# Patient Record
Sex: FEMALE | Race: WHITE | NOT HISPANIC OR LATINO | Employment: FULL TIME
[De-identification: names, ages, dates, MRNs, and addresses within clinical notes are randomized per-mention and may not be internally consistent; named-entity substitution may affect disease eponyms.]

---

## 2017-08-05 ENCOUNTER — HEALTH MAINTENANCE LETTER (OUTPATIENT)
Age: 47
End: 2017-08-05

## 2021-09-22 ENCOUNTER — HOSPITAL ENCOUNTER (EMERGENCY)
Facility: CLINIC | Age: 51
Discharge: HOME | End: 2021-09-22
Attending: NURSE PRACTITIONER | Admitting: NURSE PRACTITIONER
Payer: COMMERCIAL

## 2021-09-22 VITALS
SYSTOLIC BLOOD PRESSURE: 176 MMHG | HEART RATE: 92 BPM | HEIGHT: 61 IN | WEIGHT: 140 LBS | OXYGEN SATURATION: 100 % | BODY MASS INDEX: 26.43 KG/M2 | DIASTOLIC BLOOD PRESSURE: 94 MMHG | TEMPERATURE: 97.9 F | RESPIRATION RATE: 18 BRPM

## 2021-09-22 DIAGNOSIS — S06.0XAA CONCUSSION: ICD-10-CM

## 2021-09-22 DIAGNOSIS — S09.90XA MINOR HEAD INJURY, INITIAL ENCOUNTER: ICD-10-CM

## 2021-09-22 PROBLEM — F41.0 PANIC ATTACK: Status: ACTIVE | Noted: 2020-11-19

## 2021-09-22 PROBLEM — Z17.0 MALIGNANT NEOPLASM OF UPPER-OUTER QUADRANT OF LEFT BREAST IN FEMALE, ESTROGEN RECEPTOR POSITIVE (H): Status: ACTIVE | Noted: 2019-05-23

## 2021-09-22 PROBLEM — I10 HYPERTENSION: Status: ACTIVE | Noted: 2021-09-22

## 2021-09-22 PROBLEM — F41.9 ANXIETY: Status: ACTIVE | Noted: 2020-12-22

## 2021-09-22 PROBLEM — C50.412 MALIGNANT NEOPLASM OF UPPER-OUTER QUADRANT OF LEFT BREAST IN FEMALE, ESTROGEN RECEPTOR POSITIVE (H): Status: ACTIVE | Noted: 2019-05-23

## 2021-09-22 PROCEDURE — 99282 EMERGENCY DEPT VISIT SF MDM: CPT

## 2021-09-22 ASSESSMENT — ENCOUNTER SYMPTOMS
WEAKNESS: 0
HEADACHES: 1
VOMITING: 0
NUMBNESS: 0
WOUND: 1
NAUSEA: 0
APPETITE CHANGE: 0

## 2021-09-22 ASSESSMENT — MIFFLIN-ST. JEOR: SCORE: 1182.42

## 2021-09-22 NOTE — DISCHARGE INSTRUCTIONS
You may use the [facility] website Heads Up there is also an hernan.  This has sports based return to normal activity stepwise approach.  However this can be applied to any situation involving concussion.

## 2021-09-22 NOTE — ED TRIAGE NOTES
Pt reports tripped over sisters slippers in bathroom. Pt hit head on sink/radiator. Denies LOC. Bleeding controlled.

## 2021-09-22 NOTE — ED PROVIDER NOTES
History   Chief Complaint:  Head Injury       The history is provided by the patient.      Negar Jacobs is a 51 year old female with history of anxiety, depression, and hypertension who presents with head injury. Four days ago, the patient tripped over a pair of shoes in the bathroom and hit her forehead on a piece of marble above the radiator. She did not lose consciousness but said it felt like she had the wind knocked out of her. Since then, she has been experiencing slight head pressure, minor forgetfulness, and describes not feeling quite like herself. She denies nausea, vomiting, visual changes, weakness, numbness, and changes in appetite.     Review of Systems   Constitutional: Negative for appetite change.   Eyes: Negative for visual disturbance.   Gastrointestinal: Negative for nausea and vomiting.   Skin: Positive for wound (minor forehead abrasion).   Neurological: Positive for headaches (head pressure). Negative for weakness and numbness.   Psychiatric/Behavioral:        Minor forgetfulness   All other systems reviewed and are negative.      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 She previously worked in  ".     Physical Exam     Patient Vitals for the past 24 hrs:   BP Temp Temp src Pulse Resp SpO2 Height Weight   09/22/21 1629 (!) 176/94 97.9  F (36.6  C) Temporal 92 18 100 % 1.549 m (5' 1\") 63.5 kg (140 lb)       Physical Exam  General:  Alert, No obvious discomfort, well kept  HEENT:  Normal Voice, PERRL, EOM normal, oropharynx is normal, Uvula is midline, Conjunctivae and sclerae are normal, No lymphadenopathy, mild dependant ecchymosis to upper left eyelid, no hematoma, no crepitus   Neck: Normal range of motion, No meningismus. There is no midline cervical spine pain/tenderness. No mass is detected  CV:  Regular rate and underlying rhythm, Normal Peripheral pulses. No murmurs, rubs or clicks  Resp: Lungs are clear, No tachypnea, Non-labored breathing, No wheezing, crackles, or rales   GI:  Abdomen is soft, there is no rigidity, No distension, No tympani, No rebound tenderness, Non-surgical without peritoneal features    MS:  No major joint effusions, No asymmetric leg swelling. No calf tenderness  Skin:  Very minor superficial abrasion to left forehead, Warm, Dry  Neuro: Alert and oriented to person/place and time.  Cranial nerves II through XII grossly intact, Normal strength and sensation, follows commands, responds appropriately. GCS 15  Psych: Awake. Alert.  Mildly anxious.  Appropriate interactions. Good eye contact    Emergency Department Course   Emergency Department Course:    Reviewed:  I reviewed nursing notes, vitals, past medical history and care everywhere    Assessments:  1705 I obtained history and examined the patient as noted above.     Disposition:  The patient was discharged to home.       Impression & Plan     Medical Decision Making:  Negar Jacobs is a 51 year old female who presents today for evaluation of feelings of fogginess and not quite feeling herself after fall and head injury 4 days ago.  She did not have any loss of consciousness.  Was initially seen by urgent care and " sent to the emergency department for further evaluation.  Her examination here shows no focal neurologic deficits.  She describes textbook concussion symptoms.  She has had no increasing neurologic symptoms.  She initially was [place] CT rule negative after her fall and remains so today.  She does not have any increasing neurologic symptoms that would be concerning for delayed bleed.  After discussion there was no indication for CT imagery.  We discussed concussion symptoms and post concussion protocol.  I have referred her to the concussion .  There was no indication for further testing or imaging.  She appears to be safe and appropriate for outpatient management follow-up and is discharged home.    Covid-19  Negar Jacobs was evaluated during a global COVID-19 pandemic, which necessitated consideration that the patient might be at risk for infection with the SARS-CoV-2 virus that causes COVID-19.   Applicable protocols for evaluation were followed during the patient's care.     Diagnosis:    ICD-10-CM    1. Minor head injury, initial encounter  S09.90XA Concussion  Referral   2. Concussion  S06.0X9A Concussion  Referral       Scribe Disclosure:  I, Mariama Martin, am serving as a scribe at 4:44 PM on 9/22/2021 to document services personally performed by Wes Olivo APRN based on my observations and the provider's statements to me.     [facility]         Wes Olivo APRN CNP  09/22/21 4947

## 2021-10-01 ENCOUNTER — VIRTUAL VISIT (OUTPATIENT)
Dept: NEUROLOGY | Facility: CLINIC | Age: 51
End: 2021-10-01
Payer: COMMERCIAL

## 2021-10-01 VITALS — HEIGHT: 62 IN | WEIGHT: 135 LBS | BODY MASS INDEX: 24.84 KG/M2

## 2021-10-01 DIAGNOSIS — S09.90XA MINOR HEAD INJURY, INITIAL ENCOUNTER: ICD-10-CM

## 2021-10-01 DIAGNOSIS — F07.81 POST CONCUSSION SYNDROME: Primary | ICD-10-CM

## 2021-10-01 PROCEDURE — 99205 OFFICE O/P NEW HI 60 MIN: CPT | Mod: GT | Performed by: NURSE PRACTITIONER

## 2021-10-01 RX ORDER — HYDROXYZINE PAMOATE 25 MG/1
25 CAPSULE ORAL PRN
COMMUNITY
Start: 2021-07-01

## 2021-10-01 RX ORDER — ESCITALOPRAM OXALATE 20 MG/1
20 TABLET ORAL DAILY
COMMUNITY
Start: 2021-08-19

## 2021-10-01 RX ORDER — HYDROCHLOROTHIAZIDE 12.5 MG/1
12.5 CAPSULE ORAL DAILY
COMMUNITY
Start: 2021-03-05

## 2021-10-01 RX ORDER — TAMOXIFEN CITRATE 20 MG/1
1 TABLET ORAL DAILY
COMMUNITY
Start: 2021-09-25

## 2021-10-01 RX ORDER — OMEGA-3/DHA/EPA/FISH OIL 1600MG/5ML
15 LIQUID (ML) ORAL DAILY
COMMUNITY

## 2021-10-01 RX ORDER — METOPROLOL SUCCINATE 25 MG/1
12.5 TABLET, EXTENDED RELEASE ORAL DAILY
COMMUNITY
Start: 2021-03-05

## 2021-10-01 RX ORDER — BUSPIRONE HYDROCHLORIDE 10 MG/1
5 TABLET ORAL 2 TIMES DAILY
COMMUNITY
Start: 2021-08-19

## 2021-10-01 ASSESSMENT — MIFFLIN-ST. JEOR: SCORE: 1167.67
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
## 2021-10-01 NOTE — NURSING NOTE
Video visit [phone]  Chief Complaint   Patient presents with     Concussion     Pippa España RN on 10/1/2021 at 8:02 AM
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
## 2021-11-12 ENCOUNTER — VIRTUAL VISIT (OUTPATIENT)
Dept: NEUROLOGY | Facility: CLINIC | Age: 51
End: 2021-11-12
Payer: COMMERCIAL

## 2021-11-12 DIAGNOSIS — F07.81 POST CONCUSSION SYNDROME: Primary | ICD-10-CM

## 2021-11-12 DIAGNOSIS — G47.00 INSOMNIA, UNSPECIFIED TYPE: ICD-10-CM

## 2021-11-12 DIAGNOSIS — R41.840 ATTENTION AND CONCENTRATION DEFICIT: ICD-10-CM

## 2021-11-12 PROCEDURE — 99214 OFFICE O/P EST MOD 30 MIN: CPT | Mod: 95 | Performed by: NURSE PRACTITIONER

## 2021-11-12 RX ORDER — BUPROPION HYDROCHLORIDE 150 MG/1
150 TABLET ORAL EVERY MORNING
Qty: 30 TABLET | Refills: 3 | Status: SHIPPED | OUTPATIENT
Start: 2021-11-12 | End: 2022-01-17

## 2021-11-12 NOTE — LETTER
"    11/12/2021         RE: Negar Jacobs  [address] [address]        Dear Colleague,    Thank you for referring your patient, Negar Jacobs, to the [facility]. Please see a copy of my visit note below.    Video Visit: Concussion Follow up:     Negar Jacobs is a 51 year old female who is being evaluated via a billable video visit in light of the ongoing global health crisis (COVID-19) that requires us to abide by social distancing mandates in order to reduce the risk of COVID-19 exposure.       The patient has been notified of the following:     \"This video visit will be conducted via a video call between you and your physician/provider. We have found that certain health care needs can be provided without the need for a physical exam.  This service lets us provide the care you need with a short phone/video conversation.  If a prescription is necessary we can send it directly to your pharmacy.  If lab work is needed we can place an order for that and you can then stop by our lab to have the test done at a later time.    If during the course of the call the physician/provider feels a telephone visit is not appropriate, you will not be charged for this service.\"     Patient has given verbal consent to a video visit? Yes      Visit Check In:     Orders from previous visit: None  Neuropsychological assessment completed    No   Currently doing PT  No    Completed No   Currently doing OT  No    Completed No   Currently doing ST   No    Completed No   Psychology  Yes   Return to Work/School   Full scheduled hours  Yes       Increase in hours since last visit    No      Number of hours a day 8   Number of days a week   5        Need a note for work/school accommodations  No     Any new medication (other provider):   No   Meds started at last appointment  No   Results: N/A  Meds increased/decreased at last appointment    No Results: N/A    Currently on medication " to help with sleep    Yes    Trazodone    Currently on any mental health medications     Yes    Lexapro, Buspar      Currently on medication for attention, ADD/ADHD    No        Questions/Concerns?    Wants to talk about if there are potentially long term cognitive deficits after concussion.                                                       Workman's Comp   No   [facility]   N/A      Start Time: 0900    End Time:  0910    Total time of phone call: 10 minutes    Reid Ottonielchuck       Outpatient Follow up Mild TBI (Concussion)  Evaluation:     Negar Jacobs chief complaint is Post Concussion Syndrome     Is patient on a controlled substance prescribed by me?  No      HPI:        Pertinent History:  Per EHR: Negar Jacobs is a 51 year old female with history of anxiety, depression, and hypertension who presents with head injury. Four days ago, the patient tripped over a pair of shoes in the bathroom and hit her forehead on a piece of marble above the radiator. She did not lose consciousness but said it felt like she had the wind knocked out of her. Since then, she has been experiencing slight head pressure, minor forgetfulness, and describes not feeling quite like herself. She denies nausea, vomiting, visual changes, weakness, numbness, and changes in appetite.     Date of accident :  10/18/21      Plan:          We discussed some treatment options and have elected to   Have patient do a retrial of Wellbutrin and try Amitriptyline to help with sleep and concentration.    Medication Adjustment:  Wellbutrin 150 mg, take one tab PO every am  Amitriptyline 25 mg, take 1-2 tabs PO every HS    Return to Work/School   Full scheduled hours  Yes      Note completed    No      Return to clinic 6 weeks    Continue with the support of the clinic, reassurance, and redirection. Staff monitoring and ongoing assessments per team plan. This team will utilize appropriate emergency services if necessary. I will make myself available if  concerns or problems arise.  The patient agrees to call/message before her next visit with any questions, concerns or problems.    Progress Note:          The patient returns to the concussion clinic for a follow up visit, She was last seen by me on 10/1/21, where no medication changes were made. Patient reports that she is improving. She reports that her main concerns are cognition and sleep. Overall patient is reporting Worsening in anxiety and improvement in headaches, visual issues, sensitivity to light and noise, and brain slowing, . Patient states No change in all other physical, cognitive and emotional symptoms.       Subjective:          Overall improvement from last visit   Yes     Headaches:  Significant ongoing headaches No   Headaches: Resolved  Improvement :Yes   Current Headache No   Wake with HA  No     Worse Headache    3/10           How often: Couple of times a week    Average Headache 2/10.    Best Headache 2/10.  Brings on HA/Makes symptoms worse:   Nothing specific  Makes symptoms better. Rest  Taking  acetaminophen (Tylenol) and ibuprofen (Advil)        Helpful:  Yes     Physical Symptoms:  Headache-Yes     Since last visit  Improved     Nausea-No         Since last visit  Same       Balance problems - No    Since last visit  Same      Dizziness - No         Since last visit  Same     Visual problems - Yes    Since last visit  Improved   - difficult to read small print  Fatigue - No              Since last visit  Improved     Sensitivity to light - No        Since last visit  Improved     Sensitivity to sound - No       Since last visit  Improved     Numbness/tingling - No     Since last visit  Same         Cognitive Symptoms  Feeling mentally foggy -Yes        Since last visit  Same     Feeling slowed down -Yes        Since last visit  Improved     Difficulty Concentrating- Yes      Since last visit  Same     Difficulty remembering - Yes        Since last visit  Same - Short term memory       Emotional Symptoms  Irritability - No        Since last visit  Same     Sadness-  No      Since last visit  Same     More emotional - No       Since last visit  Same     Nervousness/anxiety -Yes       Since last visit  Worsen       Sleep History:  Sleep less than usual - No    Sleep more than usual - No    Trouble falling asleep - No       Since last visit  Same     Trouble staying asleep - Yes       Since last visit  Same     Wake feeling rested - Yes         Since last visit  Same        Migraine Headaches      Patient history of migraines.   No        Exertion:         Do the above stated symptoms worsen with physical activity? No        Since last visit  Same           Do the above stated symptoms worsen with cognitive activity? No       Since last visit  Improved            Work/School        Do the above stated symptoms worsen with school/work?        No          Have your returned to work/school? Yes      Full time    Yes      Number of hours a day   8      Number of days a week   5     Objective:          Patient Active Problem List    Diagnosis Date Noted     Hypertension 09/22/2021     Priority: Medium     Anxiety 12/22/2020     Priority: Medium     Panic attack 11/19/2020     Priority: Medium     Malignant neoplasm of upper-outer quadrant of left breast in female, estrogen receptor positive (H) 05/23/2019     Priority: Medium     Intramural leiomyoma of uterus 08/02/2016     Priority: Medium     Major depression, recurrent (H) 04/28/2010     Priority: Medium     Formatting of this note might be different from the original.  Onset in childhood, depression marked at age 20, loss of mother.  Several depressions since then.   Previously zoloft. Currently seeing a psychiatrist. On Cymbalta       Tension headache 02/24/2009     Priority: Medium     iamdcSPRAIN THORACIC REGION 02/17/2006     Priority: Medium     Sprain and strain of shoulder and upper arm 02/17/2006     Priority: Medium     Problem list name  updated by automated process. Provider to review       Nonallopathic lesion of cervical region 02/17/2006     Priority: Medium     Problem list name updated by automated process. Provider to review       terrence CERVICALGIA 01/03/2006     Priority: Medium     terrence MV COLLISION NOS- 01/03/2006     Priority: Medium     Past Medical History:   Diagnosis Date     Anxiety state, unspecified      Other psoriasis      Past Surgical History:   Procedure Laterality Date     SURGICAL HISTORY OF -   1999    s/p ganglion cyst L wrist     SURGICAL HISTORY OF -       s/p LEEP  - HPV     Family History   Problem Relation Age of Onset     Hypertension Father      Lipids Father      Hypertension Maternal Grandmother      Heart Disease Maternal Grandmother         heart attack     Breast Cancer Mother      Depression Mother      Breast Cancer Paternal Aunt      Arthritis Maternal Grandfather      Cancer Maternal Aunt         hodgekins     Circulatory Paternal Grandmother      Depression Maternal Aunt      Thyroid Disease Paternal Aunt      Current Outpatient Medications   Medication Sig Dispense Refill     busPIRone (BUSPAR) 10 MG tablet Take 5 mg by mouth 2 times daily       escitalopram (LEXAPRO) 20 MG tablet Take 20 mg by mouth daily       hydrochlorothiazide (MICROZIDE) 12.5 MG capsule Take 12.5 mg by mouth daily       hydrOXYzine (VISTARIL) 25 MG capsule Take 25 mg by mouth as needed       metoprolol succinate ER (TOPROL-XL) 25 MG 24 hr tablet Take 12.5 mg by mouth daily       Omega-3 Fatty Acids (THE VERY FINEST FISH OIL) LIQD Take 15 mLs by mouth daily       tamoxifen (NOLVADEX) 20 MG tablet Take 1 tablet by mouth daily       TRAZODONE HCL PO        ValACYclovir HCl (VALTREX PO)        VIT BALANCED B-100 OR TABS        Social History     Socioeconomic History     Marital status: Single     Spouse name: Not on file     Number of children: Not on file     Years of education: Not on file     Highest education level: Not on file    Occupational History     Not on file   Tobacco Use     Smoking status: Never Smoker     Smokeless tobacco: Never Used   Substance and Sexual Activity     Alcohol use: Yes     Comment: 3 times per week - wine     Drug use: Not Currently     Sexual activity: Not Currently   Other Topics Concern     Parent/sibling w/ CABG, MI or angioplasty before 65F 55M? Not Asked   Social History Narrative     Not on file     Social Determinants of Health     Financial Resource Strain: Not on file   Food Insecurity: Not on file   Transportation Needs: Not on file   Physical Activity: Not on file   Stress: Not on file   Social Connections: Not on file   Intimate Partner Violence: Not on file   Housing Stability: Not on file       ALLERGIES  No known allergies    The following portions of the patient's history were reviewed and updated as appropriate: allergies, current medications, past family history, past medical history, past social history, past surgical history and problem list.    Review of Systems  A comprehensive review of systems was negative except for: What is noted above    Mental Status Examination  Alertness:  alert  and oriented  Appearance:  well groomed  Behavior/Demeanor:  cooperative, pleasant and calm, with good  eye contact.  Speech:  normal and regular rate and rhythm  Psychomotor:  normal or unremarkable    Mood:  Good  Affect:  full range and appropriate and was congruent to speech content.  Thought Process/Associations: unremarkable   Thought Content: denies delusions [details in Interim History] and delusions.   Perception: denies hallucination  Insight:  good.  Judgment: good.  Attention/Concentration:  Fair  Language:  Intact  Fund of Knowledge:  Average.    Memory:  Immediate recall intact, Short-term memory intact and Long-term memory intact.         Counseling:     Discussion was held with the patient today regarding concussion in general including types of injury, symptoms that are common, treatment  and variability in time to recover  I have reassured the patient her symptoms are very common when a concussion is present and will improve with time. We discussed the risks and benefits of possible medication used to help concussive symptoms including risk of worsening depression with medication adjustments and even the possibility of emergence of suicidal ideations. We will assess for the appropriateness of possible psychotropic medication trials/changes. The patient will seek out appropriate emergency services should that become necessary. The patient agrees to call/message before her next visit with any questions, concerns or problems.    Visit Details:     Type of service: Video Visit    Video Start Time: 0910    Video End Time:  0930    Total time of video visit: 20 minutes    Originating Location: Patient's home    Distant Location:  [facility]    Mode of Communication: Video Conference via [employer] and [employer] [facility]    Diagnosis managed and treated at today's visit :  Post concussion syndrome  Post concussion headache  Nausea  Dizziness  Fatigue  Insomnia  Sensitivity to light  Sound sensitivity  Concentration and Attention deficit  Memory difficulties  Anxiety d/t a medical condition  Irritability  Return to work    Total time spent with the patient today was 30 minutes with greater than 50% of the time spent in counseling and care coordination.     10 minutes spent on the date of the encounter doing chart review, review of outside records, review of test results, interpretation of tests and documentation    General Information:     Today you had your appointment with Syeda Nagy CNP     If lab work was done today as part of your evaluation you will generally be contacted via My Chart, mail, or phone with the results within 1-5 days. If there is an alarming result we will contact you by phone. Lab results come back at varying times, I generally wait until all labs are  resulted before making comments on results. Please note labs are automatically released to My Chart once available.     If you need refills please contact your pharmacist. They will send a refill request to me to review. Please allow 3 business days for us to process all refill requests.     Please call or send a medical message through My Chart, with any questions or concerns    If you need any paperwork completed please fax forms to [phone]. Please state if you would like a copy of the completed paperwork, mailed or faxed back to the patient and a fax number to fax the paperwork to. Please allow up to 10 business days for paperwork to be completed.      MAGDALENA Harrington, CNP      [facility]  [facility] [address]  [address] [place] [id number]  Office: [phone]  Fax: [phone]          Again, thank you for allowing me to participate in the care of your patient.        Sincerely,        MAGDALENA Harrington CNP

## 2021-11-12 NOTE — PROGRESS NOTES
"Video Visit: Concussion Follow up:     Negar Jacobs is a 51 year old female who is being evaluated via a billable video visit in light of the ongoing global health crisis (COVID-19) that requires us to abide by social distancing mandates in order to reduce the risk of COVID-19 exposure.       The patient has been notified of the following:     \"This video visit will be conducted via a video call between you and your physician/provider. We have found that certain health care needs can be provided without the need for a physical exam.  This service lets us provide the care you need with a short phone/video conversation.  If a prescription is necessary we can send it directly to your pharmacy.  If lab work is needed we can place an order for that and you can then stop by our lab to have the test done at a later time.    If during the course of the call the physician/provider feels a telephone visit is not appropriate, you will not be charged for this service.\"     Patient has given verbal consent to a video visit? Yes      Visit Check In:     Orders from previous visit: None  Neuropsychological assessment completed    No   Currently doing PT  No    Completed No   Currently doing OT  No    Completed No   Currently doing ST   No    Completed No   Psychology  Yes   Return to Work/School   Full scheduled hours  Yes       Increase in hours since last visit    No      Number of hours a day 8   Number of days a week   5        Need a note for work/school accommodations  No     Any new medication (other provider):   No   Meds started at last appointment  No   Results: N/A  Meds increased/decreased at last appointment    No Results: N/A    Currently on medication to help with sleep    Yes    Trazodone    Currently on any mental health medications     Yes    Lexapro, Buspar      Currently on medication for attention, ADD/ADHD    No        Questions/Concerns?    Wants to talk about if there are potentially long term cognitive " deficits after concussion.                                                       Workman's Comp   No   [facility]   N/A      Start Time: 0900    End Time:  0910    Total time of phone call: 10 minutes    Reid Garnica       Outpatient Follow up Mild TBI (Concussion)  Evaluation:     Negar Jacobs chief complaint is Post Concussion Syndrome     Is patient on a controlled substance prescribed by me?  No      HPI:        Pertinent History:  Per EHR: Negar Jacobs is a 51 year old female with history of anxiety, depression, and hypertension who presents with head injury. Four days ago, the patient tripped over a pair of shoes in the bathroom and hit her forehead on a piece of marble above the radiator. She did not lose consciousness but said it felt like she had the wind knocked out of her. Since then, she has been experiencing slight head pressure, minor forgetfulness, and describes not feeling quite like herself. She denies nausea, vomiting, visual changes, weakness, numbness, and changes in appetite.     Date of accident :  10/18/21      Plan:          We discussed some treatment options and have elected to   Have patient do a retrial of Wellbutrin and try Amitriptyline to help with sleep and concentration.    Medication Adjustment:  Wellbutrin 150 mg, take one tab PO every am  Amitriptyline 25 mg, take 1-2 tabs PO every HS    Return to Work/School   Full scheduled hours  Yes      Note completed    No      Return to clinic 6 weeks    Continue with the support of the clinic, reassurance, and redirection. Staff monitoring and ongoing assessments per team plan. This team will utilize appropriate emergency services if necessary. I will make myself available if concerns or problems arise.  The patient agrees to call/message before her next visit with any questions, concerns or problems.    Progress Note:          The patient returns to the concussion clinic for a follow up visit, She was last seen by me on 10/1/21, where no  medication changes were made. Patient reports that she is improving. She reports that her main concerns are cognition and sleep. Overall patient is reporting Worsening in anxiety and improvement in headaches, visual issues, sensitivity to light and noise, and brain slowing, . Patient states No change in all other physical, cognitive and emotional symptoms.       Subjective:          Overall improvement from last visit   Yes     Headaches:  Significant ongoing headaches No   Headaches: Resolved  Improvement :Yes   Current Headache No   Wake with HA  No     Worse Headache    3/10           How often: Couple of times a week    Average Headache 2/10.    Best Headache 2/10.  Brings on HA/Makes symptoms worse:   Nothing specific  Makes symptoms better. Rest  Taking  acetaminophen (Tylenol) and ibuprofen (Advil)        Helpful:  Yes     Physical Symptoms:  Headache-Yes     Since last visit  Improved     Nausea-No         Since last visit  Same       Balance problems - No    Since last visit  Same      Dizziness - No         Since last visit  Same     Visual problems - Yes    Since last visit  Improved   - difficult to read small print  Fatigue - No              Since last visit  Improved     Sensitivity to light - No        Since last visit  Improved     Sensitivity to sound - No       Since last visit  Improved     Numbness/tingling - No     Since last visit  Same         Cognitive Symptoms  Feeling mentally foggy -Yes        Since last visit  Same     Feeling slowed down -Yes        Since last visit  Improved     Difficulty Concentrating- Yes      Since last visit  Same     Difficulty remembering - Yes        Since last visit  Same - Short term memory      Emotional Symptoms  Irritability - No        Since last visit  Same     Sadness-  No      Since last visit  Same     More emotional - No       Since last visit  Same     Nervousness/anxiety -Yes       Since last visit  Worsen       Sleep History:  Sleep less than usual  - No    Sleep more than usual - No    Trouble falling asleep - No       Since last visit  Same     Trouble staying asleep - Yes       Since last visit  Same     Wake feeling rested - Yes         Since last visit  Same        Migraine Headaches      Patient history of migraines.   No        Exertion:         Do the above stated symptoms worsen with physical activity? No        Since last visit  Same           Do the above stated symptoms worsen with cognitive activity? No       Since last visit  Improved            Work/School        Do the above stated symptoms worsen with school/work?        No          Have your returned to work/school? Yes      Full time    Yes      Number of hours a day   8      Number of days a week   5     Objective:          Patient Active Problem List    Diagnosis Date Noted     Hypertension 09/22/2021     Priority: Medium     Anxiety 12/22/2020     Priority: Medium     Panic attack 11/19/2020     Priority: Medium     Malignant neoplasm of upper-outer quadrant of left breast in female, estrogen receptor positive (H) 05/23/2019     Priority: Medium     Intramural leiomyoma of uterus 08/02/2016     Priority: Medium     Major depression, recurrent (H) 04/28/2010     Priority: Medium     Formatting of this note might be different from the original.  Onset in childhood, depression marked at age 20, loss of mother.  Several depressions since then.   Previously zoloft. Currently seeing a psychiatrist. On Cymbalta       Tension headache 02/24/2009     Priority: Medium     iamdcSPRAIN THORACIC REGION 02/17/2006     Priority: Medium     Sprain and strain of shoulder and upper arm 02/17/2006     Priority: Medium     Problem list name updated by automated process. Provider to review       Nonallopathic lesion of cervical region 02/17/2006     Priority: Medium     Problem list name updated by automated process. Provider to review       terrence CERVICALGIA 01/03/2006     Priority: Medium     terrence MV COLLISION  NOS- 01/03/2006     Priority: Medium     Past Medical History:   Diagnosis Date     Anxiety state, unspecified      Other psoriasis      Past Surgical History:   Procedure Laterality Date     SURGICAL HISTORY OF -   1999    s/p ganglion cyst L wrist     SURGICAL HISTORY OF -       s/p LEEP  - HPV     Family History   Problem Relation Age of Onset     Hypertension Father      Lipids Father      Hypertension Maternal Grandmother      Heart Disease Maternal Grandmother         heart attack     Breast Cancer Mother      Depression Mother      Breast Cancer Paternal Aunt      Arthritis Maternal Grandfather      Cancer Maternal Aunt         hodgekins     Circulatory Paternal Grandmother      Depression Maternal Aunt      Thyroid Disease Paternal Aunt      Current Outpatient Medications   Medication Sig Dispense Refill     busPIRone (BUSPAR) 10 MG tablet Take 5 mg by mouth 2 times daily       escitalopram (LEXAPRO) 20 MG tablet Take 20 mg by mouth daily       hydrochlorothiazide (MICROZIDE) 12.5 MG capsule Take 12.5 mg by mouth daily       hydrOXYzine (VISTARIL) 25 MG capsule Take 25 mg by mouth as needed       metoprolol succinate ER (TOPROL-XL) 25 MG 24 hr tablet Take 12.5 mg by mouth daily       Omega-3 Fatty Acids (THE VERY FINEST FISH OIL) LIQD Take 15 mLs by mouth daily       tamoxifen (NOLVADEX) 20 MG tablet Take 1 tablet by mouth daily       TRAZODONE HCL PO        ValACYclovir HCl (VALTREX PO)        VIT BALANCED B-100 OR TABS        Social History     Socioeconomic History     Marital status: Single     Spouse name: Not on file     Number of children: Not on file     Years of education: Not on file     Highest education level: Not on file   Occupational History     Not on file   Tobacco Use     Smoking status: Never Smoker     Smokeless tobacco: Never Used   Substance and Sexual Activity     Alcohol use: Yes     Comment: 3 times per week - wine     Drug use: Not Currently     Sexual activity: Not Currently    Other Topics Concern     Parent/sibling w/ CABG, MI or angioplasty before 65F 55M? Not Asked   Social History Narrative     Not on file     Social Determinants of Health     Financial Resource Strain: Not on file   Food Insecurity: Not on file   Transportation Needs: Not on file   Physical Activity: Not on file   Stress: Not on file   Social Connections: Not on file   Intimate Partner Violence: Not on file   Housing Stability: Not on file       ALLERGIES  No known allergies    The following portions of the patient's history were reviewed and updated as appropriate: allergies, current medications, past family history, past medical history, past social history, past surgical history and problem list.    Review of Systems  A comprehensive review of systems was negative except for: What is noted above    Mental Status Examination  Alertness:  alert  and oriented  Appearance:  well groomed  Behavior/Demeanor:  cooperative, pleasant and calm, with good  eye contact.  Speech:  normal and regular rate and rhythm  Psychomotor:  normal or unremarkable    Mood:  Good  Affect:  full range and appropriate and was congruent to speech content.  Thought Process/Associations: unremarkable   Thought Content: denies delusions [details in Interim History] and delusions.   Perception: denies hallucination  Insight:  good.  Judgment: good.  Attention/Concentration:  Fair  Language:  Intact  Fund of Knowledge:  Average.    Memory:  Immediate recall intact, Short-term memory intact and Long-term memory intact.         Counseling:     Discussion was held with the patient today regarding concussion in general including types of injury, symptoms that are common, treatment and variability in time to recover  I have reassured the patient her symptoms are very common when a concussion is present and will improve with time. We discussed the risks and benefits of possible medication used to help concussive symptoms including risk of worsening  depression with medication adjustments and even the possibility of emergence of suicidal ideations. We will assess for the appropriateness of possible psychotropic medication trials/changes. The patient will seek out appropriate emergency services should that become necessary. The patient agrees to call/message before her next visit with any questions, concerns or problems.    Visit Details:     Type of service: Video Visit    Video Start Time: 0910    Video End Time:  0930    Total time of video visit: 20 minutes    Originating Location: Patient's home    Distant Location:  [facility]    Mode of Communication: Video Conference via [employer] [facility] and [employer]    Diagnosis managed and treated at today's visit :  Post concussion syndrome  Post concussion headache  Nausea  Dizziness  Fatigue  Insomnia  Sensitivity to light  Sound sensitivity  Concentration and Attention deficit  Memory difficulties  Anxiety d/t a medical condition  Irritability  Return to work    Total time spent with the patient today was 30 minutes with greater than 50% of the time spent in counseling and care coordination.     10 minutes spent on the date of the encounter doing chart review, review of outside records, review of test results, interpretation of tests and documentation    General Information:     Today you had your appointment with Syeda Nagy CNP     If lab work was done today as part of your evaluation you will generally be contacted via My Chart, mail, or phone with the results within 1-5 days. If there is an alarming result we will contact you by phone. Lab results come back at varying times, I generally wait until all labs are resulted before making comments on results. Please note labs are automatically released to My Chart once available.     If you need refills please contact your pharmacist. They will send a refill request to me to review. Please allow 3 business days for us to process all  refill requests.     Please call or send a medical message through My Chart, with any questions or concerns    If you need any paperwork completed please fax forms to [phone]. Please state if you would like a copy of the completed paperwork, mailed or faxed back to the patient and a fax number to fax the paperwork to. Please allow up to 10 business days for paperwork to be completed.      MAGDALENA Harrington, CNP      [facility]  [facility] [address]  [address] [place] [id number]  Office: [phone]  Fax: [phone]

## 2021-12-03 ENCOUNTER — TELEPHONE (OUTPATIENT)
Dept: NEUROLOGY | Facility: CLINIC | Age: 51
End: 2021-12-03
Payer: COMMERCIAL

## 2021-12-03 NOTE — TELEPHONE ENCOUNTER
Left message for the pt to call back. Per Syeda's message, she wanted to see if we can connect the pt to someone who can help her get onto [employer] and a number that the pt can call for help is [phone].

## 2021-12-03 NOTE — TELEPHONE ENCOUNTER
----- Message from MAGDALENA Harrington CNP sent at 12/2/2021  7:05 PM CST -----  Can you please connect the patient with someone who can help her get on mychart

## 2021-12-08 ENCOUNTER — TELEPHONE (OUTPATIENT)
Dept: NEUROLOGY | Facility: CLINIC | Age: 51
End: 2021-12-08
Payer: COMMERCIAL

## 2021-12-08 NOTE — TELEPHONE ENCOUNTER
Patient called and was wondering what date was going to be put in the [place] paperwork that is being filled out for her. Please call and let her know. Thanks.

## 2021-12-08 NOTE — TELEPHONE ENCOUNTER
Spoke to patient and patient requested her FMLA and STD begin on Friday 12/10.     Reid MACKEY ATC

## 2021-12-08 NOTE — TELEPHONE ENCOUNTER
Pt called to update us on her health. Pt stated that since she was last seen by Syeda her Boyfriend has committed suicide.  She stated that she is having increased issues at work. Pt will send [facility] paperwork to the clinic for us to fill out.      Reid MACKEY ATC

## 2021-12-09 ENCOUNTER — TELEPHONE (OUTPATIENT)
Dept: NEUROLOGY | Facility: CLINIC | Age: 51
End: 2021-12-09
Payer: COMMERCIAL

## 2021-12-09 NOTE — TELEPHONE ENCOUNTER
Spoke with patient and reassured her that we will fill out the FMLA and STD paperwork when we receive it.     Reid Nicole LAT ATC

## 2021-12-09 NOTE — TELEPHONE ENCOUNTER
Patient called feeling anxious about talking with her employer about paperwork that was sent over and wanted to talk with someone before she talks with her employer about the paperwork.. Her concern is to confirm that the paper work will be filled out, and sent back.   Negar phone # [phone]

## 2022-01-09 ENCOUNTER — HEALTH MAINTENANCE LETTER (OUTPATIENT)
Age: 52
End: 2022-01-09

## 2022-01-11 ENCOUNTER — VIRTUAL VISIT (OUTPATIENT)
Dept: NEUROLOGY | Facility: CLINIC | Age: 52
End: 2022-01-11
Payer: COMMERCIAL

## 2022-01-11 DIAGNOSIS — G47.00 INSOMNIA, UNSPECIFIED TYPE: ICD-10-CM

## 2022-01-11 DIAGNOSIS — F07.81 POST CONCUSSION SYNDROME: Primary | ICD-10-CM

## 2022-01-11 DIAGNOSIS — R41.840 ATTENTION AND CONCENTRATION DEFICIT: ICD-10-CM

## 2022-01-11 PROCEDURE — 99215 OFFICE O/P EST HI 40 MIN: CPT | Mod: GT | Performed by: NURSE PRACTITIONER

## 2022-01-11 NOTE — LETTER
"    1/11/2022         RE: Negar Jacobs  [address] [address]        Dear Colleague,    Thank you for referring your patient, Negar Jacobs, to the [facility]. Please see a copy of my visit note below.     Video Visit: Concussion Follow up:     Negar Jacobs is a 51 year old female who is being evaluated via a billable video visit in light of the ongoing global health crisis (COVID-19) that requires us to abide by social distancing mandates in order to reduce the risk of COVID-19 exposure.       The patient has been notified of the following:     \"This video visit will be conducted via a video call between you and your physician/provider. We have found that certain health care needs can be provided without the need for a physical exam.  This service lets us provide the care you need with a short phone/video conversation.  If a prescription is necessary we can send it directly to your pharmacy.  If lab work is needed we can place an order for that and you can then stop by our lab to have the test done at a later time.    If during the course of the call the physician/provider feels a telephone visit is not appropriate, you will not be charged for this service.\"     Patient has given verbal consent to a video visit? Yes      Visit Check In:     Orders from previous visit: None  Neuropsychological assessment completed    No   Currently doing PT  No    Completed No   Currently doing OT  No    Completed No   Currently doing ST   No    Completed No   Psychology  Yes   Return to Work/School   Full scheduled hours  No , currently on leave      Increase in hours since last visit    No      Number of hours a day 0   Number of days a week   0        Need a note for work/school accommodations  Yes     Any new medication (other provider):   No   Meds started at last appointment  Yes, Bupropion and Amitriptyline   Results: Not sure  Meds increased/decreased at last " appointment    No Results: N/A    Currently on medication to help with sleep    Yes    Trazodone    Currently on any mental health medications     Yes    Lexapro, Buspar      Currently on medication for attention, ADD/ADHD    No        Questions/Concerns?    Yes, wants to talk about meds                                                      Workman's Comp   No   QRC   N/A      Start Time: 12:55 pm    End Time:  1:00 pm    Total time of phone call: 5 minutes    ELIZ Simon      Outpatient Follow up Mild TBI (Concussion)  Evaluation:     Negar Jacobs chief complaint is Post Concussion Syndrome     Is patient on a controlled substance prescribed by me?  No      HPI:        Pertinent History:  Per EHR: Negar Jacobs is a 51 year old female with history of anxiety, depression, and hypertension who presents with head injury. Four days ago, the patient tripped over a pair of shoes in the bathroom and hit her forehead on a piece of marble above the radiator. She did not lose consciousness but said it felt like she had the wind knocked out of her. Since then, she has been experiencing slight head pressure, minor forgetfulness, and describes not feeling quite like herself. She denies nausea, vomiting, visual changes, weakness, numbness, and changes in appetite.     Date of accident :  10/18/21      Plan:          We discussed some treatment options and have elected to have the patient rest for the rest of the week, then restart her gradual return to work.    Medication Adjustment:  No medication changes    Return to Work/School   Full scheduled hours  Yes      Note completed    Yes      Return to clinic 6 weeks    Continue with the support of the clinic, reassurance, and redirection. Staff monitoring and ongoing assessments per team plan. This team will utilize appropriate emergency services if necessary. I will make myself available if concerns or problems arise.  The patient agrees to call/message before her next  visit with any questions, concerns or problems.    Progress Note:          The patient returns to the concussion clinic for a follow up visit, She was last seen by me on 11/12/21, where there were medication changes, Wellbutrin 150 mg, take one tab PO every am Amitriptyline 25 mg, take 1-2 tabs PO every HS. The patient reports she is improving. Her headaches have reduced in frequency and severity.  Overall the patient is reporting no change in anxiety.  She reports improvement in all other physical, cognitive, and emotional symptoms    Subjective:          Overall improvement from last visit   Yes     Headaches:  Significant ongoing headaches No   Headaches: Resolved  Improvement :Yes   Current Headache No   Wake with HA  No     Worse Headache    3/10           How often: Couple of times a week    Average Headache 2/10.    Best Headache 2/10.  Brings on HA/Makes symptoms worse:   Nothing specific  Makes symptoms better. Rest  Taking  acetaminophen (Tylenol) and ibuprofen (Advil)        Helpful:  Yes     Physical Symptoms:  Headache-Yes     Since last visit  Improved     Nausea-No         Since last visit  Same       Balance problems - No    Since last visit  Same      Dizziness - No         Since last visit  Same     Visual problems - No    Since last visit  Improved    Fatigue - No              Since last visit  Improved     Sensitivity to light - No        Since last visit  Improved     Sensitivity to sound - No       Since last visit  Improved     Numbness/tingling - No     Since last visit  Same         Cognitive Symptoms  Feeling mentally foggy -Yes        Since last visit  Improved     Feeling slowed down -Yes        Since last visit  Improved     Difficulty Concentrating- Yes      Since last visit  Improved, slightly    Difficulty remembering - Yes        Since last visit  Improved , slightly    Emotional Symptoms  Irritability - No        Since last visit  Same     Sadness-  No      Since last visit  Same      More emotional - No       Since last visit  Same     Nervousness/anxiety -Yes       Since last visit  Same       Sleep History:  Sleep less than usual - No    Sleep more than usual - No    Trouble falling asleep - No       Since last visit  Same     Trouble staying asleep - No       Since last visit  Improved     Wake feeling rested - Yes         Since last visit  Same        Migraine Headaches      Patient history of migraines.   No        Exertion:         Do the above stated symptoms worsen with physical activity? No        Since last visit  Same           Do the above stated symptoms worsen with cognitive activity? No       Since last visit  Same            Work/School        Do the above stated symptoms worsen with school/work?        No          Have your returned to work/school? No,  currently on leave    Full time    No      Number of hours a day   0      Number of days a week   0     Objective:          Patient Active Problem List    Diagnosis Date Noted     Hypertension 09/22/2021     Priority: Medium     Anxiety 12/22/2020     Priority: Medium     Panic attack 11/19/2020     Priority: Medium     Malignant neoplasm of upper-outer quadrant of left breast in female, estrogen receptor positive (H) 05/23/2019     Priority: Medium     Intramural leiomyoma of uterus 08/02/2016     Priority: Medium     Major depression, recurrent (H) 04/28/2010     Priority: Medium     Formatting of this note might be different from the original.  Onset in childhood, depression marked at age 20, loss of mother.  Several depressions since then.   Previously zoloft. Currently seeing a psychiatrist. On Cymbalta       Tension headache 02/24/2009     Priority: Medium     iamdcSPRAIN THORACIC REGION 02/17/2006     Priority: Medium     Sprain and strain of shoulder and upper arm 02/17/2006     Priority: Medium     Problem list name updated by automated process. Provider to review       Nonallopathic lesion of cervical region  02/17/2006     Priority: Medium     Problem list name updated by automated process. Provider to review       terrence CERVICALGIA 01/03/2006     Priority: Medium     terrence MV COLLISION NOS- 01/03/2006     Priority: Medium     Past Medical History:   Diagnosis Date     Anxiety state, unspecified      Other psoriasis      Past Surgical History:   Procedure Laterality Date     SURGICAL HISTORY OF -   1999    s/p ganglion cyst L wrist     SURGICAL HISTORY OF -       s/p LEEP  - HPV     Family History   Problem Relation Age of Onset     Hypertension Father      Lipids Father      Hypertension Maternal Grandmother      Heart Disease Maternal Grandmother         heart attack     Breast Cancer Mother      Depression Mother      Breast Cancer Paternal Aunt      Arthritis Maternal Grandfather      Cancer Maternal Aunt         hodgekins     Circulatory Paternal Grandmother      Depression Maternal Aunt      Thyroid Disease Paternal Aunt      Current Outpatient Medications   Medication Sig Dispense Refill     amitriptyline (ELAVIL) 25 MG tablet Take 1-2 tablets (25-50 mg) by mouth At Bedtime 60 tablet 3     buPROPion (WELLBUTRIN XL) 150 MG 24 hr tablet Take 1 tablet (150 mg) by mouth every morning 30 tablet 3     busPIRone (BUSPAR) 10 MG tablet Take 5 mg by mouth 2 times daily       escitalopram (LEXAPRO) 20 MG tablet Take 20 mg by mouth daily       hydrochlorothiazide (MICROZIDE) 12.5 MG capsule Take 12.5 mg by mouth daily       hydrOXYzine (VISTARIL) 25 MG capsule Take 25 mg by mouth as needed       metoprolol succinate ER (TOPROL-XL) 25 MG 24 hr tablet Take 12.5 mg by mouth daily       Omega-3 Fatty Acids (THE VERY FINEST FISH OIL) LIQD Take 15 mLs by mouth daily       tamoxifen (NOLVADEX) 20 MG tablet Take 1 tablet by mouth daily       TRAZODONE HCL PO        ValACYclovir HCl (VALTREX PO)        VIT BALANCED B-100 OR TABS        Social History     Socioeconomic History     Marital status: Single     Spouse name: Not on file      Number of children: Not on file     Years of education: Not on file     Highest education level: Not on file   Occupational History     Not on file   Tobacco Use     Smoking status: Never Smoker     Smokeless tobacco: Never Used   Substance and Sexual Activity     Alcohol use: Yes     Comment: 3 times per week - wine     Drug use: Not Currently     Sexual activity: Not Currently   Other Topics Concern     Parent/sibling w/ CABG, MI or angioplasty before 65F 55M? Not Asked   Social History Narrative     Not on file     Social Determinants of Health     Financial Resource Strain: Not on file   Food Insecurity: Not on file   Transportation Needs: Not on file   Physical Activity: Not on file   Stress: Not on file   Social Connections: Not on file   Intimate Partner Violence: Not on file   Housing Stability: Not on file       ALLERGIES  No known allergies    The following portions of the patient's history were reviewed and updated as appropriate: allergies, current medications, past family history, past medical history, past social history, past surgical history and problem list.    Review of Systems  A comprehensive review of systems was negative except for: What is noted above    Mental Status Examination  Alertness:  alert  and oriented  Appearance:  well groomed  Behavior/Demeanor:  cooperative, pleasant and calm, with good  eye contact.  Speech:  normal and regular rate and rhythm  Psychomotor:  normal or unremarkable    Mood:  Good  Affect:  full range and appropriate and was congruent to speech content.  Thought Process/Associations: unremarkable   Thought Content: denies delusions [details in Interim History] and delusions.   Perception: denies hallucination  Insight:  good.  Judgment: good.  Attention/Concentration:  Fair  Language:  Intact  Fund of Knowledge:  Average.    Memory:  Immediate recall intact, Short-term memory intact and Long-term memory intact.         Counseling:     Discussion was held with  the patient today regarding concussion in general including types of injury, symptoms that are common, treatment and variability in time to recover  I have reassured the patient her symptoms are very common when a concussion is present and will improve with time. We discussed the risks and benefits of possible medication used to help concussive symptoms including risk of worsening depression with medication adjustments and even the possibility of emergence of suicidal ideations. We will assess for the appropriateness of possible psychotropic medication trials/changes. The patient will seek out appropriate emergency services should that become necessary. The patient agrees to call/message before her next visit with any questions, concerns or problems.    Visit Details:     Type of service: Video Visit    Video Start Time: 1300    Video End Time:  1330    Total time of video visit: 30 minutes    Originating Location: Patient's home    Distant Location:  [facility]    Mode of Communication: Video Conference via [employer] [facility] and [employer]    Diagnosis managed and treated at today's visit :  Post concussion syndrome  Post concussion headache  Nausea  Dizziness  Fatigue  Insomnia  Sensitivity to light  Sound sensitivity  Concentration and Attention deficit  Memory difficulties  Anxiety d/t a medical condition  Irritability  Return to work    Total time spent with the patient today was 40 minutes with greater than 50% of the time spent in counseling and care coordination.     10 minutes spent on the date of the encounter doing chart review, review of outside records, review of test results, interpretation of tests and documentation    General Information:     Today you had your appointment with Syeda Nagy CNP     If lab work was done today as part of your evaluation you will generally be contacted via My Chart, mail, or phone with the results within 1-5 days. If there is an alarming  result we will contact you by phone. Lab results come back at varying times, I generally wait until all labs are resulted before making comments on results. Please note labs are automatically released to My Chart once available.     If you need refills please contact your pharmacist. They will send a refill request to me to review. Please allow 3 business days for us to process all refill requests.     Please call or send a medical message through My Chart, with any questions or concerns    If you need any paperwork completed please fax forms to [phone]. Please state if you would like a copy of the completed paperwork, mailed or faxed back to the patient and a fax number to fax the paperwork to. Please allow up to 10 business days for paperwork to be completed.      MAGDALENA Harrington, CNP      [facility]  [facility] [address]  [address] [place] [id number]  Office: [phone]  Fax: [phone]          Again, thank you for allowing me to participate in the care of your patient.        Sincerely,        MAGDALENA Harrington CNP

## 2022-01-11 NOTE — PROGRESS NOTES
" Video Visit: Concussion Follow up:     Negar Jacobs is a 51 year old female who is being evaluated via a billable video visit in light of the ongoing global health crisis (COVID-19) that requires us to abide by social distancing mandates in order to reduce the risk of COVID-19 exposure.       The patient has been notified of the following:     \"This video visit will be conducted via a video call between you and your physician/provider. We have found that certain health care needs can be provided without the need for a physical exam.  This service lets us provide the care you need with a short phone/video conversation.  If a prescription is necessary we can send it directly to your pharmacy.  If lab work is needed we can place an order for that and you can then stop by our lab to have the test done at a later time.    If during the course of the call the physician/provider feels a telephone visit is not appropriate, you will not be charged for this service.\"     Patient has given verbal consent to a video visit? Yes      Visit Check In:     Orders from previous visit: None  Neuropsychological assessment completed    No   Currently doing PT  No    Completed No   Currently doing OT  No    Completed No   Currently doing ST   No    Completed No   Psychology  Yes   Return to Work/School   Full scheduled hours  No , currently on leave      Increase in hours since last visit    No      Number of hours a day 0   Number of days a week   0        Need a note for work/school accommodations  Yes     Any new medication (other provider):   No   Meds started at last appointment  Yes, Bupropion and Amitriptyline   Results: Not sure  Meds increased/decreased at last appointment    No Results: N/A    Currently on medication to help with sleep    Yes    Trazodone    Currently on any mental health medications     Yes    Lexapro, Buspar      Currently on medication for attention, ADD/ADHD    No        Questions/Concerns?    Yes, wants " to talk about meds                                                      Workman's Comp   No   [facility]   N/A      Start Time: 12:55 pm    End Time:  1:00 pm    Total time of phone call: 5 minutes    ELIZ Simon      Outpatient Follow up Mild TBI (Concussion)  Evaluation:     Negar Jacobs chief complaint is Post Concussion Syndrome     Is patient on a controlled substance prescribed by me?  No      HPI:        Pertinent History:  Per EHR: Negar Jacobs is a 51 year old female with history of anxiety, depression, and hypertension who presents with head injury. Four days ago, the patient tripped over a pair of shoes in the bathroom and hit her forehead on a piece of marble above the radiator. She did not lose consciousness but said it felt like she had the wind knocked out of her. Since then, she has been experiencing slight head pressure, minor forgetfulness, and describes not feeling quite like herself. She denies nausea, vomiting, visual changes, weakness, numbness, and changes in appetite.     Date of accident :  10/18/21      Plan:          We discussed some treatment options and have elected to have the patient rest for the rest of the week, then restart her gradual return to work.    Medication Adjustment:  No medication changes    Return to Work/School   Full scheduled hours  Yes      Note completed    Yes      Return to clinic 6 weeks    Continue with the support of the clinic, reassurance, and redirection. Staff monitoring and ongoing assessments per team plan. This team will utilize appropriate emergency services if necessary. I will make myself available if concerns or problems arise.  The patient agrees to call/message before her next visit with any questions, concerns or problems.    Progress Note:          The patient returns to the concussion clinic for a follow up visit, She was last seen by me on 11/12/21, where there were medication changes, Wellbutrin 150 mg, take one tab PO every am  Amitriptyline 25 mg, take 1-2 tabs PO every HS. The patient reports she is improving. Her headaches have reduced in frequency and severity.  Overall the patient is reporting no change in anxiety.  She reports improvement in all other physical, cognitive, and emotional symptoms    Subjective:          Overall improvement from last visit   Yes     Headaches:  Significant ongoing headaches No   Headaches: Resolved  Improvement :Yes   Current Headache No   Wake with HA  No     Worse Headache    3/10           How often: Couple of times a week    Average Headache 2/10.    Best Headache 2/10.  Brings on HA/Makes symptoms worse:   Nothing specific  Makes symptoms better. Rest  Taking  acetaminophen (Tylenol) and ibuprofen (Advil)        Helpful:  Yes     Physical Symptoms:  Headache-Yes     Since last visit  Improved     Nausea-No         Since last visit  Same       Balance problems - No    Since last visit  Same      Dizziness - No         Since last visit  Same     Visual problems - No    Since last visit  Improved    Fatigue - No              Since last visit  Improved     Sensitivity to light - No        Since last visit  Improved     Sensitivity to sound - No       Since last visit  Improved     Numbness/tingling - No     Since last visit  Same         Cognitive Symptoms  Feeling mentally foggy -Yes        Since last visit  Improved     Feeling slowed down -Yes        Since last visit  Improved     Difficulty Concentrating- Yes      Since last visit  Improved, slightly    Difficulty remembering - Yes        Since last visit  Improved , slightly    Emotional Symptoms  Irritability - No        Since last visit  Same     Sadness-  No      Since last visit  Same     More emotional - No       Since last visit  Same     Nervousness/anxiety -Yes       Since last visit  Same       Sleep History:  Sleep less than usual - No    Sleep more than usual - No    Trouble falling asleep - No       Since last visit  Same     Trouble  staying asleep - No       Since last visit  Improved     Wake feeling rested - Yes         Since last visit  Same        Migraine Headaches      Patient history of migraines.   No        Exertion:         Do the above stated symptoms worsen with physical activity? No        Since last visit  Same           Do the above stated symptoms worsen with cognitive activity? No       Since last visit  Same            Work/School        Do the above stated symptoms worsen with school/work?        No          Have your returned to work/school? No,  currently on leave    Full time    No      Number of hours a day   0      Number of days a week   0     Objective:          Patient Active Problem List    Diagnosis Date Noted     Hypertension 09/22/2021     Priority: Medium     Anxiety 12/22/2020     Priority: Medium     Panic attack 11/19/2020     Priority: Medium     Malignant neoplasm of upper-outer quadrant of left breast in female, estrogen receptor positive (H) 05/23/2019     Priority: Medium     Intramural leiomyoma of uterus 08/02/2016     Priority: Medium     Major depression, recurrent (H) 04/28/2010     Priority: Medium     Formatting of this note might be different from the original.  Onset in childhood, depression marked at age 20, loss of mother.  Several depressions since then.   Previously zoloft. Currently seeing a psychiatrist. On Cymbalta       Tension headache 02/24/2009     Priority: Medium     iamdcSPRAIN THORACIC REGION 02/17/2006     Priority: Medium     Sprain and strain of shoulder and upper arm 02/17/2006     Priority: Medium     Problem list name updated by automated process. Provider to review       Nonallopathic lesion of cervical region 02/17/2006     Priority: Medium     Problem list name updated by automated process. Provider to review       terrence CERVICALGIA 01/03/2006     Priority: Medium     terrence MV COLLISION NOS- 01/03/2006     Priority: Medium     Past Medical History:   Diagnosis Date      Anxiety state, unspecified      Other psoriasis      Past Surgical History:   Procedure Laterality Date     SURGICAL HISTORY OF -   1999    s/p ganglion cyst L wrist     SURGICAL HISTORY OF -       s/p LEEP  - HPV     Family History   Problem Relation Age of Onset     Hypertension Father      Lipids Father      Hypertension Maternal Grandmother      Heart Disease Maternal Grandmother         heart attack     Breast Cancer Mother      Depression Mother      Breast Cancer Paternal Aunt      Arthritis Maternal Grandfather      Cancer Maternal Aunt         hodgekins     Circulatory Paternal Grandmother      Depression Maternal Aunt      Thyroid Disease Paternal Aunt      Current Outpatient Medications   Medication Sig Dispense Refill     amitriptyline (ELAVIL) 25 MG tablet Take 1-2 tablets (25-50 mg) by mouth At Bedtime 60 tablet 3     buPROPion (WELLBUTRIN XL) 150 MG 24 hr tablet Take 1 tablet (150 mg) by mouth every morning 30 tablet 3     busPIRone (BUSPAR) 10 MG tablet Take 5 mg by mouth 2 times daily       escitalopram (LEXAPRO) 20 MG tablet Take 20 mg by mouth daily       hydrochlorothiazide (MICROZIDE) 12.5 MG capsule Take 12.5 mg by mouth daily       hydrOXYzine (VISTARIL) 25 MG capsule Take 25 mg by mouth as needed       metoprolol succinate ER (TOPROL-XL) 25 MG 24 hr tablet Take 12.5 mg by mouth daily       Omega-3 Fatty Acids (THE VERY FINEST FISH OIL) LIQD Take 15 mLs by mouth daily       tamoxifen (NOLVADEX) 20 MG tablet Take 1 tablet by mouth daily       TRAZODONE HCL PO        ValACYclovir HCl (VALTREX PO)        VIT BALANCED B-100 OR TABS        Social History     Socioeconomic History     Marital status: Single     Spouse name: Not on file     Number of children: Not on file     Years of education: Not on file     Highest education level: Not on file   Occupational History     Not on file   Tobacco Use     Smoking status: Never Smoker     Smokeless tobacco: Never Used   Substance and Sexual Activity      Alcohol use: Yes     Comment: 3 times per week - wine     Drug use: Not Currently     Sexual activity: Not Currently   Other Topics Concern     Parent/sibling w/ CABG, MI or angioplasty before 65F 55M? Not Asked   Social History Narrative     Not on file     Social Determinants of Health     Financial Resource Strain: Not on file   Food Insecurity: Not on file   Transportation Needs: Not on file   Physical Activity: Not on file   Stress: Not on file   Social Connections: Not on file   Intimate Partner Violence: Not on file   Housing Stability: Not on file       ALLERGIES  No known allergies    The following portions of the patient's history were reviewed and updated as appropriate: allergies, current medications, past family history, past medical history, past social history, past surgical history and problem list.    Review of Systems  A comprehensive review of systems was negative except for: What is noted above    Mental Status Examination  Alertness:  alert  and oriented  Appearance:  well groomed  Behavior/Demeanor:  cooperative, pleasant and calm, with good  eye contact.  Speech:  normal and regular rate and rhythm  Psychomotor:  normal or unremarkable    Mood:  Good  Affect:  full range and appropriate and was congruent to speech content.  Thought Process/Associations: unremarkable   Thought Content: denies delusions [details in Interim History] and delusions.   Perception: denies hallucination  Insight:  good.  Judgment: good.  Attention/Concentration:  Fair  Language:  Intact  Fund of Knowledge:  Average.    Memory:  Immediate recall intact, Short-term memory intact and Long-term memory intact.         Counseling:     Discussion was held with the patient today regarding concussion in general including types of injury, symptoms that are common, treatment and variability in time to recover  I have reassured the patient her symptoms are very common when a concussion is present and will improve with time. We  discussed the risks and benefits of possible medication used to help concussive symptoms including risk of worsening depression with medication adjustments and even the possibility of emergence of suicidal ideations. We will assess for the appropriateness of possible psychotropic medication trials/changes. The patient will seek out appropriate emergency services should that become necessary. The patient agrees to call/message before her next visit with any questions, concerns or problems.    Visit Details:     Type of service: Video Visit    Video Start Time: 1300    Video End Time:  1330    Total time of video visit: 30 minutes    Originating Location: Patient's home    Distant Location:  [facility]    Mode of Communication: Video Conference via [employer] and american [employer]    Diagnosis managed and treated at today's visit :  Post concussion syndrome  Post concussion headache  Nausea  Dizziness  Fatigue  Insomnia  Sensitivity to light  Sound sensitivity  Concentration and Attention deficit  Memory difficulties  Anxiety d/t a medical condition  Irritability  Return to work    Total time spent with the patient today was 40 minutes with greater than 50% of the time spent in counseling and care coordination.     10 minutes spent on the date of the encounter doing chart review, review of outside records, review of test results, interpretation of tests and documentation    General Information:     Today you had your appointment with Syeda Nagy CNP     If lab work was done today as part of your evaluation you will generally be contacted via My Chart, mail, or phone with the results within 1-5 days. If there is an alarming result we will contact you by phone. Lab results come back at varying times, I generally wait until all labs are resulted before making comments on results. Please note labs are automatically released to My Chart once available.     If you need refills please contact  your pharmacist. They will send a refill request to me to review. Please allow 3 business days for us to process all refill requests.     Please call or send a medical message through My Chart, with any questions or concerns    If you need any paperwork completed please fax forms to [phone]. Please state if you would like a copy of the completed paperwork, mailed or faxed back to the patient and a fax number to fax the paperwork to. Please allow up to 10 business days for paperwork to be completed.      MAGDALENA Harrington, [facility]  [facility] [address]  [address] [place] [id number]  Office: [phone]  Fax: [phone]

## 2022-01-17 DIAGNOSIS — R41.840 ATTENTION AND CONCENTRATION DEFICIT: ICD-10-CM

## 2022-01-17 DIAGNOSIS — F07.81 POST CONCUSSION SYNDROME: ICD-10-CM

## 2022-01-17 DIAGNOSIS — G47.00 INSOMNIA, UNSPECIFIED TYPE: ICD-10-CM

## 2022-01-17 RX ORDER — BUPROPION HYDROCHLORIDE 150 MG/1
150 TABLET ORAL EVERY MORNING
Qty: 30 TABLET | Refills: 1 | Status: SHIPPED | OUTPATIENT
Start: 2022-01-17 | End: 2022-01-17

## 2022-01-17 RX ORDER — BUPROPION HYDROCHLORIDE 150 MG/1
150 TABLET ORAL EVERY MORNING
Qty: 90 TABLET | Refills: 1 | Status: SHIPPED | OUTPATIENT
Start: 2022-01-17 | End: 2022-01-17

## 2022-01-17 RX ORDER — BUPROPION HYDROCHLORIDE 150 MG/1
150 TABLET ORAL EVERY MORNING
Qty: 90 TABLET | Refills: 1 | Status: SHIPPED | OUTPATIENT
Start: 2022-01-17 | End: 2022-05-10

## 2022-02-15 ENCOUNTER — VIRTUAL VISIT (OUTPATIENT)
Dept: NEUROLOGY | Facility: CLINIC | Age: 52
End: 2022-02-15
Payer: COMMERCIAL

## 2022-02-15 DIAGNOSIS — G47.00 INSOMNIA, UNSPECIFIED TYPE: ICD-10-CM

## 2022-02-15 DIAGNOSIS — F07.81 POST CONCUSSION SYNDROME: Primary | ICD-10-CM

## 2022-02-15 DIAGNOSIS — R41.840 ATTENTION AND CONCENTRATION DEFICIT: ICD-10-CM

## 2022-02-15 PROCEDURE — 99215 OFFICE O/P EST HI 40 MIN: CPT | Mod: GT | Performed by: NURSE PRACTITIONER

## 2022-02-15 NOTE — LETTER
"    2/15/2022         RE: Negar Jacobs  [address] [address]        Dear Colleague,    Thank you for referring your patient, Negar Jacobs, to the [facility]. Please see a copy of my visit note below.     Video Visit: Concussion Follow up:     Negar Jacobs is a 51 year old female who is being evaluated via a billable video visit in light of the ongoing global health crisis (COVID-19) that requires us to abide by social distancing mandates in order to reduce the risk of COVID-19 exposure.       The patient has been notified of the following:     \"This video visit will be conducted via a video call between you and your physician/provider. We have found that certain health care needs can be provided without the need for a physical exam.  This service lets us provide the care you need with a short phone/video conversation.  If a prescription is necessary we can send it directly to your pharmacy.  If lab work is needed we can place an order for that and you can then stop by our lab to have the test done at a later time.    If during the course of the call the physician/provider feels a telephone visit is not appropriate, you will not be charged for this service.\"     Patient has given verbal consent to a video visit? Yes      Visit Check In:     Orders from previous visit: None  Neuropsychological assessment completed    No   Currently doing PT  Yes    Completed No   Currently doing OT  No    Completed No   Currently doing ST   No    Completed No   Psychology  Yes   Return to Work/School   Full scheduled hours  Yes, returned to work on 1/19/2022      Increase in hours since last visit    Yes   Number of hours a day 8-10   Number of days a week   5        Need a note for work/school accommodations  Yes     Any new medication (other provider):   No   Meds started at last appointment No  Results: N/A  Meds increased/decreased at last appointment    No Results: " N/A    Currently on medication to help with sleep    Yes    Trazodone    Currently on any mental health medications     Yes    Lexapro, Buspar      Currently on medication for attention, ADD/ADHD    No        Questions/Concerns?    None                                                      Workman's Comp   No   QRC   N/A      Start Time: 8:45 am    End Time:  8:50 am    Total time of phone call: 5 minutes    Patient would like the video invitation sent by: [employer]  Number/e-mail address: [phone]       ELIZ Simon      Outpatient Follow up Mild TBI (Concussion)  Evaluation:     Negar Jacobs chief complaint is Post Concussion Syndrome     Is patient on a controlled substance prescribed by me?  No      HPI:        Pertinent History:  Per EHR: Negar Jacobs is a 51 year old female with history of anxiety, depression, and hypertension who presents with head injury. Four days ago, the patient tripped over a pair of shoes in the bathroom and hit her forehead on a piece of marble above the radiator. She did not lose consciousness but said it felt like she had the wind knocked out of her. Since then, she has been experiencing slight head pressure, minor forgetfulness, and describes not feeling quite like herself. She denies nausea, vomiting, visual changes, weakness, numbness, and changes in appetite.     Date of accident :  10/18/21      Plan:          We discussed some treatment options and have elected to continue with current therapies    Medication Adjustment:  No medication changes, amitriptyline will be discontinued    Return to Work/School   Full scheduled hours  Yes      Note completed    Yes      Return to clinic 8 weeks      Continue with the support of the clinic, reassurance, and redirection. Staff monitoring and ongoing assessments per team plan. This team will utilize appropriate emergency services if necessary. I will make myself available if concerns or problems arise.  The patient agrees to  "call/message before her next visit with any questions, concerns or problems.    Progress Note:          The patient returns to the concussion clinic for a follow up visit, She was last seen by me on 1/11/22, where there were no medication changes.  Patient reports that she has no headaches \"that she is aware of\".  She took Aleve from her work and this did help her emotionally.  Patient reports still having some \"brain fog\".  Overall patient is reporting no change in fatigue, difficulty concentrating, and waking feeling rested.  She reports worsening in difficulty remembering.  Patient reports improvement in all other physical, cognitive, and emotional symptoms  Subjective:          Overall improvement from last visit   Yes     Headaches:  Significant ongoing headaches No   Headaches: Resolved  Improvement :Yes   Current Headache No   Wake with HA  No     Worse Headache    3/10           How often: have not had any headaches   Average Headache 2/10.    Best Headache 0/10.  Brings on HA/Makes symptoms worse:   Nothing specific  Makes symptoms better. Rest  Taking  acetaminophen (Tylenol) and ibuprofen (Advil)        Helpful:  Yes     Physical Symptoms:  Headache-No     Since last visit  Improved     Nausea-No         Since last visit  Improved       Balance problems - No      Dizziness - No         Visual problems - No    Since last visit  Improved    Fatigue - Yes             Since last visit  Same     Sensitivity to light - No        Since last visit  Improved     Sensitivity to sound - No       Since last visit  Improved     Numbness/tingling - No     S        Cognitive Symptoms  Feeling mentally foggy -No        Since last visit  Improved     Feeling slowed down -No        Since last visit  Improved     Difficulty Concentrating- Yes      Since last visit  Same  Difficulty remembering - Yes        Since last visit  Worsen     Emotional Symptoms  Irritability - No          Sadness-  No          More emotional - No   "     ,  Nervousness/anxiety -Yes       Since last visit  Improved       Sleep History:  Sleep less than usual - No    Sleep more than usual - No    Trouble falling asleep - No       Since last visit  Same     Trouble staying asleep - No       Since last visit  Improved     Wake feeling rested - Yes         Since last visit  Same        Migraine Headaches      Patient history of migraines.   No        Exertion:         Do the above stated symptoms worsen with physical activity? No        Since last visit  Same           Do the above stated symptoms worsen with cognitive activity? Yes       Since last visit  Same            Work/School        Do the above stated symptoms worsen with school/work?        No          Have your returned to work/school? Yes  Full time    Yes      Number of hours a day   8-10      Number of days a week   5     Objective:          Patient Active Problem List    Diagnosis Date Noted     Hypertension 09/22/2021     Priority: Medium     Anxiety 12/22/2020     Priority: Medium     Panic attack 11/19/2020     Priority: Medium     Malignant neoplasm of upper-outer quadrant of left breast in female, estrogen receptor positive (H) 05/23/2019     Priority: Medium     Intramural leiomyoma of uterus 08/02/2016     Priority: Medium     Major depression, recurrent (H) 04/28/2010     Priority: Medium     Formatting of this note might be different from the original.  Onset in childhood, depression marked at age 20, loss of mother.  Several depressions since then.   Previously zoloft. Currently seeing a psychiatrist. On Cymbalta       Tension headache 02/24/2009     Priority: Medium     iamdcSPRAIN THORACIC REGION 02/17/2006     Priority: Medium     Sprain and strain of shoulder and upper arm 02/17/2006     Priority: Medium     Problem list name updated by automated process. Provider to review       Nonallopathic lesion of cervical region 02/17/2006     Priority: Medium     Problem list name updated by  automated process. Provider to review       terrence CERVICALGIA 01/03/2006     Priority: Medium     terrence MV COLLISION NOS- 01/03/2006     Priority: Medium     Past Medical History:   Diagnosis Date     Anxiety state, unspecified      Other psoriasis      Past Surgical History:   Procedure Laterality Date     SURGICAL HISTORY OF -   1999    s/p ganglion cyst L wrist     SURGICAL HISTORY OF -       s/p LEEP  - HPV     Family History   Problem Relation Age of Onset     Hypertension Father      Lipids Father      Hypertension Maternal Grandmother      Heart Disease Maternal Grandmother         heart attack     Breast Cancer Mother      Depression Mother      Breast Cancer Paternal Aunt      Arthritis Maternal Grandfather      Cancer Maternal Aunt         hodgekins     Circulatory Paternal Grandmother      Depression Maternal Aunt      Thyroid Disease Paternal Aunt      Current Outpatient Medications   Medication Sig Dispense Refill     amitriptyline (ELAVIL) 25 MG tablet Take 1-2 tablets (25-50 mg) by mouth At Bedtime 180 tablet 1     buPROPion (WELLBUTRIN XL) 150 MG 24 hr tablet Take 1 tablet (150 mg) by mouth every morning 90 tablet 1     busPIRone (BUSPAR) 10 MG tablet Take 5 mg by mouth 2 times daily       escitalopram (LEXAPRO) 20 MG tablet Take 20 mg by mouth daily       hydrochlorothiazide (MICROZIDE) 12.5 MG capsule Take 12.5 mg by mouth daily       hydrOXYzine (VISTARIL) 25 MG capsule Take 25 mg by mouth as needed       metoprolol succinate ER (TOPROL-XL) 25 MG 24 hr tablet Take 12.5 mg by mouth daily       Omega-3 Fatty Acids (THE VERY FINEST FISH OIL) LIQD Take 15 mLs by mouth daily       tamoxifen (NOLVADEX) 20 MG tablet Take 1 tablet by mouth daily       TRAZODONE HCL PO        ValACYclovir HCl (VALTREX PO)        VIT BALANCED B-100 OR TABS        Social History     Socioeconomic History     Marital status: Single     Spouse name: Not on file     Number of children: Not on file     Years of education: Not  on file     Highest education level: Not on file   Occupational History     Not on file   Tobacco Use     Smoking status: Never Smoker     Smokeless tobacco: Never Used   Substance and Sexual Activity     Alcohol use: Yes     Comment: 3 times per week - wine     Drug use: Not Currently     Sexual activity: Not Currently   Other Topics Concern     Parent/sibling w/ CABG, MI or angioplasty before 65F 55M? Not Asked   Social History Narrative     Not on file     Social Determinants of Health     Financial Resource Strain: Not on file   Food Insecurity: Not on file   Transportation Needs: Not on file   Physical Activity: Not on file   Stress: Not on file   Social Connections: Not on file   Intimate Partner Violence: Not on file   Housing Stability: Not on file       ALLERGIES  No known allergies    The following portions of the patient's history were reviewed and updated as appropriate: allergies, current medications, past family history, past medical history, past social history, past surgical history and problem list.    Review of Systems  A comprehensive review of systems was negative except for: What is noted above    Mental Status Examination  Alertness:  alert  and oriented  Appearance:  well groomed  Behavior/Demeanor:  cooperative, pleasant and calm, with good  eye contact.  Speech:  normal and regular rate and rhythm  Psychomotor:  normal or unremarkable    Mood:  Good  Affect:  full range and appropriate and was congruent to speech content.  Thought Process/Associations: unremarkable   Thought Content: denies delusions [details in Interim History] and delusions.   Perception: denies hallucination  Insight:  good.  Judgment: good.  Attention/Concentration:  Fair  Language:  Intact  Fund of Knowledge:  Average.    Memory:  Immediate recall intact, Short-term memory intact and Long-term memory intact.         Counseling:     Discussion was held with the patient today regarding concussion in general including  types of injury, symptoms that are common, treatment and variability in time to recover  I have reassured the patient her symptoms are very common when a concussion is present and will improve with time. We discussed the risks and benefits of possible medication used to help concussive symptoms including risk of worsening depression with medication adjustments and even the possibility of emergence of suicidal ideations. We will assess for the appropriateness of possible psychotropic medication trials/changes. The patient will seek out appropriate emergency services should that become necessary. The patient agrees to call/message before her next visit with any questions, concerns or problems.    Visit Details:     Type of service: Video Visit    Video Start Time: 0900    Video End Time:  0930    Total time of video visit: 30 minutes    Originating Location: Patient's home    Distant Location:  [facility]    Mode of Communication: Video Conference via [employer]    Diagnosis managed and treated at today's visit :  Post concussion syndrome  Post concussion headache  Nausea  Dizziness  Fatigue  Insomnia  Sensitivity to light  Sound sensitivity  Concentration and Attention deficit  Memory difficulties  Anxiety d/t a medical condition  Irritability  Return to work    Total time spent with the patient today was 40 minutes with greater than 50% of the time spent in counseling and care coordination.     10 minutes spent on the date of the encounter doing chart review, review of outside records, review of test results, interpretation of tests and documentation    General Information:     Today you had your appointment with Syeda Nagy CNP     If lab work was done today as part of your evaluation you will generally be contacted via My Chart, mail, or phone with the results within 1-5 days. If there is an alarming result we will contact you by phone. Lab results come back  at varying times, I generally wait until all labs are resulted before making comments on results. Please note labs are automatically released to My Chart once available.     If you need refills please contact your pharmacist. They will send a refill request to me to review. Please allow 3 business days for us to process all refill requests.     Please call or send a medical message through My Chart, with any questions or concerns    If you need any paperwork completed please fax forms to [phone]. Please state if you would like a copy of the completed paperwork, mailed or faxed back to the patient and a fax number to fax the paperwork to. Please allow up to 10 business days for paperwork to be completed.      MAGDALENA Harrington, CNP      [facility]  [facility] [address]  [address] [place] [id number]  Office: [phone]  Fax: [phone]          Again, thank you for allowing me to participate in the care of your patient.        Sincerely,        MAGDALENA Harrington CNP

## 2022-02-15 NOTE — PROGRESS NOTES
" Video Visit: Concussion Follow up:     Negar Jacobs is a 51 year old female who is being evaluated via a billable video visit in light of the ongoing global health crisis (COVID-19) that requires us to abide by social distancing mandates in order to reduce the risk of COVID-19 exposure.       The patient has been notified of the following:     \"This video visit will be conducted via a video call between you and your physician/provider. We have found that certain health care needs can be provided without the need for a physical exam.  This service lets us provide the care you need with a short phone/video conversation.  If a prescription is necessary we can send it directly to your pharmacy.  If lab work is needed we can place an order for that and you can then stop by our lab to have the test done at a later time.    If during the course of the call the physician/provider feels a telephone visit is not appropriate, you will not be charged for this service.\"     Patient has given verbal consent to a video visit? Yes      Visit Check In:     Orders from previous visit: None  Neuropsychological assessment completed    No   Currently doing PT  Yes    Completed No   Currently doing OT  No    Completed No   Currently doing ST   No    Completed No   Psychology  Yes   Return to Work/School   Full scheduled hours  Yes, returned to work on 1/19/2022      Increase in hours since last visit    Yes   Number of hours a day 8-10   Number of days a week   5        Need a note for work/school accommodations  Yes     Any new medication (other provider):   No   Meds started at last appointment No  Results: N/A  Meds increased/decreased at last appointment    No Results: N/A    Currently on medication to help with sleep    Yes    Trazodone    Currently on any mental health medications     Yes    Lexapro, Buspar      Currently on medication for attention, ADD/ADHD    No        Questions/Concerns?    None                               "                        Workman's Comp   No   [facility]   N/A      Start Time: 8:45 am    End Time:  8:50 am    Total time of phone call: 5 minutes    Patient would like the video invitation sent by: [employer]  Number/e-mail address: [phone]       ELIZ Simon      Outpatient Follow up Mild TBI (Concussion)  Evaluation:     Negar Jacobs chief complaint is Post Concussion Syndrome     Is patient on a controlled substance prescribed by me?  No      HPI:        Pertinent History:  Per EHR: Negar Jacobs is a 51 year old female with history of anxiety, depression, and hypertension who presents with head injury. Four days ago, the patient tripped over a pair of shoes in the bathroom and hit her forehead on a piece of marble above the radiator. She did not lose consciousness but said it felt like she had the wind knocked out of her. Since then, she has been experiencing slight head pressure, minor forgetfulness, and describes not feeling quite like herself. She denies nausea, vomiting, visual changes, weakness, numbness, and changes in appetite.     Date of accident :  10/18/21      Plan:          We discussed some treatment options and have elected to continue with current therapies    Medication Adjustment:  No medication changes, amitriptyline will be discontinued    Return to Work/School   Full scheduled hours  Yes      Note completed    Yes      Return to clinic 8 weeks      Continue with the support of the clinic, reassurance, and redirection. Staff monitoring and ongoing assessments per team plan. This team will utilize appropriate emergency services if necessary. I will make myself available if concerns or problems arise.  The patient agrees to call/message before her next visit with any questions, concerns or problems.    Progress Note:          The patient returns to the concussion clinic for a follow up visit, She was last seen by me on 1/11/22, where there were no medication changes.  Patient reports that  "she has no headaches \"that she is aware of\".  She took Aleve from her work and this did help her emotionally.  Patient reports still having some \"brain fog\".  Overall patient is reporting no change in fatigue, difficulty concentrating, and waking feeling rested.  She reports worsening in difficulty remembering.  Patient reports improvement in all other physical, cognitive, and emotional symptoms  Subjective:          Overall improvement from last visit   Yes     Headaches:  Significant ongoing headaches No   Headaches: Resolved  Improvement :Yes   Current Headache No   Wake with HA  No     Worse Headache    3/10           How often: have not had any headaches   Average Headache 2/10.    Best Headache 0/10.  Brings on HA/Makes symptoms worse:   Nothing specific  Makes symptoms better. Rest  Taking  acetaminophen (Tylenol) and ibuprofen (Advil)        Helpful:  Yes     Physical Symptoms:  Headache-No     Since last visit  Improved     Nausea-No         Since last visit  Improved       Balance problems - No      Dizziness - No         Visual problems - No    Since last visit  Improved    Fatigue - Yes             Since last visit  Same     Sensitivity to light - No        Since last visit  Improved     Sensitivity to sound - No       Since last visit  Improved     Numbness/tingling - No     S        Cognitive Symptoms  Feeling mentally foggy -No        Since last visit  Improved     Feeling slowed down -No        Since last visit  Improved     Difficulty Concentrating- Yes      Since last visit  Same  Difficulty remembering - Yes        Since last visit  Worsen     Emotional Symptoms  Irritability - No          Sadness-  No          More emotional - No       ,  Nervousness/anxiety -Yes       Since last visit  Improved       Sleep History:  Sleep less than usual - No    Sleep more than usual - No    Trouble falling asleep - No       Since last visit  Same     Trouble staying asleep - No       Since last visit  Improved  "    Wake feeling rested - Yes         Since last visit  Same        Migraine Headaches      Patient history of migraines.   No        Exertion:         Do the above stated symptoms worsen with physical activity? No        Since last visit  Same           Do the above stated symptoms worsen with cognitive activity? Yes       Since last visit  Same            Work/School        Do the above stated symptoms worsen with school/work?        No          Have your returned to work/school? Yes  Full time    Yes      Number of hours a day   8-10      Number of days a week   5     Objective:          Patient Active Problem List    Diagnosis Date Noted     Hypertension 09/22/2021     Priority: Medium     Anxiety 12/22/2020     Priority: Medium     Panic attack 11/19/2020     Priority: Medium     Malignant neoplasm of upper-outer quadrant of left breast in female, estrogen receptor positive (H) 05/23/2019     Priority: Medium     Intramural leiomyoma of uterus 08/02/2016     Priority: Medium     Major depression, recurrent (H) 04/28/2010     Priority: Medium     Formatting of this note might be different from the original.  Onset in childhood, depression marked at age 20, loss of mother.  Several depressions since then.   Previously zoloft. Currently seeing a psychiatrist. On Cymbalta       Tension headache 02/24/2009     Priority: Medium     iamdcSPRAIN THORACIC REGION 02/17/2006     Priority: Medium     Sprain and strain of shoulder and upper arm 02/17/2006     Priority: Medium     Problem list name updated by automated process. Provider to review       Nonallopathic lesion of cervical region 02/17/2006     Priority: Medium     Problem list name updated by automated process. Provider to review       terrence CERVICALGIA 01/03/2006     Priority: Medium     terrence MV COLLISION NOS- 01/03/2006     Priority: Medium     Past Medical History:   Diagnosis Date     Anxiety state, unspecified      Other psoriasis      Past Surgical  History:   Procedure Laterality Date     SURGICAL HISTORY OF -   1999    s/p ganglion cyst L wrist     SURGICAL HISTORY OF -       s/p LEEP  - HPV     Family History   Problem Relation Age of Onset     Hypertension Father      Lipids Father      Hypertension Maternal Grandmother      Heart Disease Maternal Grandmother         heart attack     Breast Cancer Mother      Depression Mother      Breast Cancer Paternal Aunt      Arthritis Maternal Grandfather      Cancer Maternal Aunt         hodgekins     Circulatory Paternal Grandmother      Depression Maternal Aunt      Thyroid Disease Paternal Aunt      Current Outpatient Medications   Medication Sig Dispense Refill     amitriptyline (ELAVIL) 25 MG tablet Take 1-2 tablets (25-50 mg) by mouth At Bedtime 180 tablet 1     buPROPion (WELLBUTRIN XL) 150 MG 24 hr tablet Take 1 tablet (150 mg) by mouth every morning 90 tablet 1     busPIRone (BUSPAR) 10 MG tablet Take 5 mg by mouth 2 times daily       escitalopram (LEXAPRO) 20 MG tablet Take 20 mg by mouth daily       hydrochlorothiazide (MICROZIDE) 12.5 MG capsule Take 12.5 mg by mouth daily       hydrOXYzine (VISTARIL) 25 MG capsule Take 25 mg by mouth as needed       metoprolol succinate ER (TOPROL-XL) 25 MG 24 hr tablet Take 12.5 mg by mouth daily       Omega-3 Fatty Acids (THE VERY FINEST FISH OIL) LIQD Take 15 mLs by mouth daily       tamoxifen (NOLVADEX) 20 MG tablet Take 1 tablet by mouth daily       TRAZODONE HCL PO        ValACYclovir HCl (VALTREX PO)        VIT BALANCED B-100 OR TABS        Social History     Socioeconomic History     Marital status: Single     Spouse name: Not on file     Number of children: Not on file     Years of education: Not on file     Highest education level: Not on file   Occupational History     Not on file   Tobacco Use     Smoking status: Never Smoker     Smokeless tobacco: Never Used   Substance and Sexual Activity     Alcohol use: Yes     Comment: 3 times per week - wine     Drug  use: Not Currently     Sexual activity: Not Currently   Other Topics Concern     Parent/sibling w/ CABG, MI or angioplasty before 65F 55M? Not Asked   Social History Narrative     Not on file     Social Determinants of Health     Financial Resource Strain: Not on file   Food Insecurity: Not on file   Transportation Needs: Not on file   Physical Activity: Not on file   Stress: Not on file   Social Connections: Not on file   Intimate Partner Violence: Not on file   Housing Stability: Not on file       ALLERGIES  No known allergies    The following portions of the patient's history were reviewed and updated as appropriate: allergies, current medications, past family history, past medical history, past social history, past surgical history and problem list.    Review of Systems  A comprehensive review of systems was negative except for: What is noted above    Mental Status Examination  Alertness:  alert  and oriented  Appearance:  well groomed  Behavior/Demeanor:  cooperative, pleasant and calm, with good  eye contact.  Speech:  normal and regular rate and rhythm  Psychomotor:  normal or unremarkable    Mood:  Good  Affect:  full range and appropriate and was congruent to speech content.  Thought Process/Associations: unremarkable   Thought Content: denies delusions [details in Interim History] and delusions.   Perception: denies hallucination  Insight:  good.  Judgment: good.  Attention/Concentration:  Fair  Language:  Intact  Fund of Knowledge:  Average.    Memory:  Immediate recall intact, Short-term memory intact and Long-term memory intact.         Counseling:     Discussion was held with the patient today regarding concussion in general including types of injury, symptoms that are common, treatment and variability in time to recover  I have reassured the patient her symptoms are very common when a concussion is present and will improve with time. We discussed the risks and benefits of possible medication used to  help concussive symptoms including risk of worsening depression with medication adjustments and even the possibility of emergence of suicidal ideations. We will assess for the appropriateness of possible psychotropic medication trials/changes. The patient will seek out appropriate emergency services should that become necessary. The patient agrees to call/message before her next visit with any questions, concerns or problems.    Visit Details:     Type of service: Video Visit    Video Start Time: 0900    Video End Time:  0930    Total time of video visit: 30 minutes    Originating Location: Patient's home    Distant Location:  [facility]    Mode of Communication: Video Conference via [employer] [facility] and [facility]    Diagnosis managed and treated at today's visit :  Post concussion syndrome  Post concussion headache  Nausea  Dizziness  Fatigue  Insomnia  Sensitivity to light  Sound sensitivity  Concentration and Attention deficit  Memory difficulties  Anxiety d/t a medical condition  Irritability  Return to work    Total time spent with the patient today was 40 minutes with greater than 50% of the time spent in counseling and care coordination.     10 minutes spent on the date of the encounter doing chart review, review of outside records, review of test results, interpretation of tests and documentation    General Information:     Today you had your appointment with Syeda Nagy CNP     If lab work was done today as part of your evaluation you will generally be contacted via My Chart, mail, or phone with the results within 1-5 days. If there is an alarming result we will contact you by phone. Lab results come back at varying times, I generally wait until all labs are resulted before making comments on results. Please note labs are automatically released to My Chart once available.     If you need refills please contact your pharmacist. They will send a refill request to me to  review. Please allow 3 business days for us to process all refill requests.     Please call or send a medical message through My Chart, with any questions or concerns    If you need any paperwork completed please fax forms to [phone]. Please state if you would like a copy of the completed paperwork, mailed or faxed back to the patient and a fax number to fax the paperwork to. Please allow up to 10 business days for paperwork to be completed.      MAGDALENA Harrington, CNP      [facility]  [facility] [address]  [address] [place] [id number]  Office: [phone]  Fax: [phone]

## 2022-03-15 ENCOUNTER — VIRTUAL VISIT (OUTPATIENT)
Dept: NEUROLOGY | Facility: CLINIC | Age: 52
End: 2022-03-15
Payer: COMMERCIAL

## 2022-03-15 DIAGNOSIS — F07.81 POST CONCUSSION SYNDROME: Primary | ICD-10-CM

## 2022-03-15 PROCEDURE — 99214 OFFICE O/P EST MOD 30 MIN: CPT | Mod: 95 | Performed by: NURSE PRACTITIONER

## 2022-03-15 NOTE — LETTER
"    3/15/2022         RE: Negar Jacobs  [address] [address]        Dear Colleague,    Thank you for referring your patient, Negar Jacobs, to the [facility]. Please see a copy of my visit note below.     Video Visit: Concussion Follow up:     Negar Jacobs is a 51 year old female who is being evaluated via a billable video visit in light of the ongoing global health crisis (COVID-19) that requires us to abide by social distancing mandates in order to reduce the risk of COVID-19 exposure.       The patient has been notified of the following:     \"This video visit will be conducted via a video call between you and your physician/provider. We have found that certain health care needs can be provided without the need for a physical exam.  This service lets us provide the care you need with a short phone/video conversation.  If a prescription is necessary we can send it directly to your pharmacy.  If lab work is needed we can place an order for that and you can then stop by our lab to have the test done at a later time.    If during the course of the call the physician/provider feels a telephone visit is not appropriate, you will not be charged for this service.\"     Patient has given verbal consent to a video visit? Yes      Visit Check In:     Orders from previous visit: None  Neuropsychological assessment completed    No   Currently doing PT  Yes    Completed No   Currently doing OT  No    Completed No   Currently doing ST   No    Completed No   Psychology  Yes   Return to Work/School   Full scheduled hours  Yes, returned to work on 1/19/2022      Increase in hours since last visit    Yes   Number of hours a day 8-10   Number of days a week   5        Need a note for work/school accommodations  No     Any new medication (other provider):   No   Meds started at last appointment No  Results: N/A  Meds increased/decreased at last appointment    Yes Results: " N/A    Currently on medication to help with sleep    Yes    Trazodone    Currently on any mental health medications     Yes    Lexapro Buspar      Currently on medication for attention, ADD/ADHD    No        Questions/Concerns?    Nahid in headaches and tinnitus                                                      Workman's Comp   No   QRC   N/A      Start Time: 8:00 am    End Time:  8:15 am    Total time of phone call: 15 minutes    Patient would like the video invitation sent by: [employer]  Number/e-mail address: [phone]       [employer]      Outpatient Follow up Mild TBI (Concussion)  Evaluation:     Negar Jacobs chief complaint is Post Concussion Syndrome     Is patient on a controlled substance prescribed by me?  No      HPI:        Pertinent History:  Per EHR: Negar Jacobs is a 51 year old female with history of anxiety, depression, and hypertension who presents with head injury. Four days ago, the patient tripped over a pair of shoes in the bathroom and hit her forehead on a piece of marble above the radiator. She did not lose consciousness but said it felt like she had the wind knocked out of her. Since then, she has been experiencing slight head pressure, minor forgetfulness, and describes not feeling quite like herself. She denies nausea, vomiting, visual changes, weakness, numbness, and changes in appetite.     Date of accident :  10/18/21      Plan:          We discussed some treatment options and have elected to order physical therapy for post concussive syndrome, help patient with her neck pain.  Another physical therapy order for craniosacral massage.  Info sheet will be sent to patient to help reduce stimulation.    Medication Adjustment:  No medication changes    Return to Work/School   Full scheduled hours  Yes      Note completed    No      Return to clinic 8 weeks      Continue with the support of the clinic, reassurance, and redirection. Staff monitoring and ongoing  assessments per team plan. This team will utilize appropriate emergency services if necessary. I will make myself available if concerns or problems arise.  The patient agrees to call/message before her next visit with any questions, concerns or problems.    Progress Note:          The patient returns to the concussion clinic for a follow up visit, She was last seen by me on 2/15/22, where no medication changes were made.  Patient reports that she is doing well and did not have any headaches up to a couple weeks ago.  She started to have more neck pain.  Since neck pain started the patient also has been experiencing more headaches.  Patient is reporting worsening in many of her symptoms, this most likely is due to the neck pain and hopefully symptoms will improve as patient gets better control of her neck pain. Overall patient is reporting worsening in headaches, nausea, dizziness, sensitivity to sound.  He reporting no change in fatigue, difficulty concentrating, difficulty remembering, and waking feeling rested.  Patient reports improvement in all other physical, cognitive, and emotional symptoms    Subjective:          Overall improvement from last visit   No    Headaches:  Significant ongoing headaches No    Headaches: Worsen  Improvement :No   Current Headache No   Wake with HA  No     Worse Headache   5/10           How often: have not had any headaches   Average Headache 2/10.    Best Headache 0/10.  Brings on HA/Makes symptoms worse:   Nothing specific  Makes symptoms better. Rest  Taking  acetaminophen (Tylenol) and ibuprofen (Advil)        Helpful:  Yes     Physical Symptoms:  Headache-Yes    Since last visit  Worsen    Nausea-Yes         Since last visit  Worsen      Balance problems - No      Dizziness - Yes            Since last visit  Worsen         Visual problems - No    Since last visit  Improved    Fatigue - Yes             Since last visit  Same     Sensitivity to light - No        Since last visit   Improved     Sensitivity to sound - Yes       Since last visit  Worsen - Tinnitus   Numbness/tingling - No             Cognitive Symptoms  Feeling mentally foggy -No        Since last visit  Improved     Feeling slowed down -No        Since last visit  Improved     Difficulty Concentrating- Yes      Since last visit  Same  Difficulty remembering - Yes        Since last visit  Same     Emotional Symptoms  Irritability - No          Sadness-  No          More emotional - No       ,  Nervousness/anxiety -No      Since last visit  Improved       Sleep History:  Sleep less than usual - No    Sleep more than usual - No     Trouble falling asleep - No       Since last visit  Same     Trouble staying asleep - No       Since last visit  Improved     Wake feeling rested - Yes         Since last visit  Same        Migraine Headaches      Patient history of migraines.   No        Exertion:         Do the above stated symptoms worsen with physical activity? No        Since last visit  Same           Do the above stated symptoms worsen with cognitive activity? Yes       Since last visit  Same            Work/School        Do the above stated symptoms worsen with school/work?        No          Have your returned to work/school? Yes  Full time    Yes      Number of hours a day   8-10      Number of days a week   5     Objective:          Patient Active Problem List    Diagnosis Date Noted     Hypertension 09/22/2021     Priority: Medium     Anxiety 12/22/2020     Priority: Medium     Panic attack 11/19/2020     Priority: Medium     Malignant neoplasm of upper-outer quadrant of left breast in female, estrogen receptor positive (H) 05/23/2019     Priority: Medium     Intramural leiomyoma of uterus 08/02/2016     Priority: Medium     Major depression, recurrent (H) 04/28/2010     Priority: Medium     Formatting of this note might be different from the original.  Onset in childhood, depression marked at age 20, loss of mother.   Several depressions since then.   Previously zoloft. Currently seeing a psychiatrist. On Cymbalta       Tension headache 02/24/2009     Priority: Medium     iamdcSPRAIN THORACIC REGION 02/17/2006     Priority: Medium     Sprain and strain of shoulder and upper arm 02/17/2006     Priority: Medium     Problem list name updated by automated process. Provider to review       Nonallopathic lesion of cervical region 02/17/2006     Priority: Medium     Problem list name updated by automated process. Provider to review       terrence CERVICALGIA 01/03/2006     Priority: Medium     terrence MV COLLISION NOS- 01/03/2006     Priority: Medium     Past Medical History:   Diagnosis Date     Anxiety state, unspecified      Other psoriasis      Past Surgical History:   Procedure Laterality Date     SURGICAL HISTORY OF -   1999    s/p ganglion cyst L wrist     SURGICAL HISTORY OF -       s/p LEEP  - HPV     Family History   Problem Relation Age of Onset     Hypertension Father      Lipids Father      Hypertension Maternal Grandmother      Heart Disease Maternal Grandmother         heart attack     Breast Cancer Mother      Depression Mother      Breast Cancer Paternal Aunt      Arthritis Maternal Grandfather      Cancer Maternal Aunt         hodgekins     Circulatory Paternal Grandmother      Depression Maternal Aunt      Thyroid Disease Paternal Aunt      Current Outpatient Medications   Medication Sig Dispense Refill     buPROPion (WELLBUTRIN XL) 150 MG 24 hr tablet Take 1 tablet (150 mg) by mouth every morning 90 tablet 1     busPIRone (BUSPAR) 10 MG tablet Take 5 mg by mouth 2 times daily       escitalopram (LEXAPRO) 20 MG tablet Take 20 mg by mouth daily       hydrochlorothiazide (MICROZIDE) 12.5 MG capsule Take 12.5 mg by mouth daily       hydrOXYzine (VISTARIL) 25 MG capsule Take 25 mg by mouth as needed       metoprolol succinate ER (TOPROL-XL) 25 MG 24 hr tablet Take 12.5 mg by mouth daily       Omega-3 Fatty Acids (THE VERY  FINEST FISH OIL) LIQD Take 15 mLs by mouth daily       tamoxifen (NOLVADEX) 20 MG tablet Take 1 tablet by mouth daily       TRAZODONE HCL PO        ValACYclovir HCl (VALTREX PO)        VIT BALANCED B-100 OR TABS        Social History     Socioeconomic History     Marital status: Single     Spouse name: Not on file     Number of children: Not on file     Years of education: Not on file     Highest education level: Not on file   Occupational History     Not on file   Tobacco Use     Smoking status: Never Smoker     Smokeless tobacco: Never Used   Substance and Sexual Activity     Alcohol use: Yes     Comment: 3 times per week - wine     Drug use: Not Currently     Sexual activity: Not Currently   Other Topics Concern     Parent/sibling w/ CABG, MI or angioplasty before 65F 55M? Not Asked   Social History Narrative     Not on file     Social Determinants of Health     Financial Resource Strain: Not on file   Food Insecurity: Not on file   Transportation Needs: Not on file   Physical Activity: Not on file   Stress: Not on file   Social Connections: Not on file   Intimate Partner Violence: Not on file   Housing Stability: Not on file       ALLERGIES  No known allergies    The following portions of the patient's history were reviewed and updated as appropriate: allergies, current medications, past family history, past medical history, past social history, past surgical history and problem list.    Review of Systems  A comprehensive review of systems was negative except for: What is noted above    Mental Status Examination  Alertness:  alert  and oriented  Appearance:  well groomed  Behavior/Demeanor:  cooperative, pleasant and calm, with good  eye contact.  Speech:  normal and regular rate and rhythm  Psychomotor:  normal or unremarkable    Mood:  Good  Affect:  full range and appropriate and was congruent to speech content.  Thought Process/Associations: unremarkable   Thought Content: denies delusions [details in  Interim History] and delusions.   Perception: denies hallucination  Insight:  good.  Judgment: good.  Attention/Concentration:  Fair  Language:  Intact  Fund of Knowledge:  Average.    Memory:  Immediate recall intact, Short-term memory intact and Long-term memory intact.      Counseling:   Discussion was held with the patient today regarding concussion in general including types of injury, symptoms that are common, treatment and variability in time to recover  I have reassured the patient her symptoms are very common when a concussion is present and will improve with time. We discussed the risks and benefits of possible medication used to help concussive symptoms including risk of worsening depression with medication adjustments and even the possibility of emergence of suicidal ideations. We will assess for the appropriateness of possible psychotropic medication trials/changes. The patient will seek out appropriate emergency services should that become necessary. The patient agrees to call/message before her next visit with any questions, concerns or problems.    Visit Details:     Type of service: Video Visit    Video Start Time: 0840    Video End Time:  0900    Total time of video visit: 20 minutes    Originating Location: Patient's home    Distant Location:  [facility]    Mode of Communication: Video Conference via [facility] and [employer]    Diagnosis managed and treated at today's visit :  Post concussion syndrome  Post concussion headache  Nausea  Dizziness  Fatigue  Insomnia  Sensitivity to light  Sound sensitivity  Concentration and Attention deficit  Memory difficulties  Anxiety d/t a medical condition  Irritability  Return to work    Total time spent with the patient today was 30 minutes with greater than 50% of the time spent in counseling and care coordination.     10 minutes spent on the date of the encounter doing chart review, review of outside records, review of  test results, interpretation of tests and documentation    General Information:     Today you had your appointment with Syeda Nagy CNP     If lab work was done today as part of your evaluation you will generally be contacted via My Chart, mail, or phone with the results within 1-5 days. If there is an alarming result we will contact you by phone. Lab results come back at varying times, I generally wait until all labs are resulted before making comments on results. Please note labs are automatically released to My Chart once available.     If you need refills please contact your pharmacist. They will send a refill request to me to review. Please allow 3 business days for us to process all refill requests.     Please call or send a medical message through My Chart, with any questions or concerns    If you need any paperwork completed please fax forms to [phone]. Please state if you would like a copy of the completed paperwork, mailed or faxed back to the patient and a fax number to fax the paperwork to. Please allow up to 10 business days for paperwork to be completed.      MAGDALENA Harrington CNP      [facility]  [facility] [address]  [address] [place] [address]  Office: [phone]  Fax: [phone]          Again, thank you for allowing me to participate in the care of your patient.        Sincerely,        MAGDALENA Harrington CNP

## 2022-03-15 NOTE — PROGRESS NOTES
" Video Visit: Concussion Follow up:     Negar Jacobs is a 51 year old female who is being evaluated via a billable video visit in light of the ongoing global health crisis (COVID-19) that requires us to abide by social distancing mandates in order to reduce the risk of COVID-19 exposure.       The patient has been notified of the following:     \"This video visit will be conducted via a video call between you and your physician/provider. We have found that certain health care needs can be provided without the need for a physical exam.  This service lets us provide the care you need with a short phone/video conversation.  If a prescription is necessary we can send it directly to your pharmacy.  If lab work is needed we can place an order for that and you can then stop by our lab to have the test done at a later time.    If during the course of the call the physician/provider feels a telephone visit is not appropriate, you will not be charged for this service.\"     Patient has given verbal consent to a video visit? Yes      Visit Check In:     Orders from previous visit: None  Neuropsychological assessment completed    No   Currently doing PT  Yes    Completed No   Currently doing OT  No    Completed No   Currently doing ST   No    Completed No   Psychology  Yes   Return to Work/School   Full scheduled hours  Yes, returned to work on 1/19/2022      Increase in hours since last visit    Yes   Number of hours a day 8-10   Number of days a week   5        Need a note for work/school accommodations  No     Any new medication (other provider):   No   Meds started at last appointment No  Results: N/A  Meds increased/decreased at last appointment    Yes Results: N/A    Currently on medication to help with sleep    Yes    Trazodone    Currently on any mental health medications     Yes    Lexapro, Buspar      Currently on medication for attention, ADD/ADHD    No        Questions/Concerns?    Nahid in headaches and tinnitus    "                                                   Workman's Comp   No   [facility]   N/A      Start Time: 8:00 am    End Time:  8:15 am    Total time of phone call: 15 minutes    Patient would like the video invitation sent by: [facility]  Number/e-mail address: [phone]       KEY Servin ATC      Outpatient Follow up Mild TBI (Concussion)  Evaluation:     Negar Jacobs chief complaint is Post Concussion Syndrome     Is patient on a controlled substance prescribed by me?  No      HPI:        Pertinent History:  Per EHR: Negar Jacobs is a 51 year old female with history of anxiety, depression, and hypertension who presents with head injury. Four days ago, the patient tripped over a pair of shoes in the bathroom and hit her forehead on a piece of marble above the radiator. She did not lose consciousness but said it felt like she had the wind knocked out of her. Since then, she has been experiencing slight head pressure, minor forgetfulness, and describes not feeling quite like herself. She denies nausea, vomiting, visual changes, weakness, numbness, and changes in appetite.     Date of accident :  10/18/21      Plan:          We discussed some treatment options and have elected to order physical therapy for post concussive syndrome, help patient with her neck pain.  Another physical therapy order for craniosacral massage.  Info sheet will be sent to patient to help reduce stimulation.    Medication Adjustment:  No medication changes    Return to Work/School   Full scheduled hours  Yes      Note completed    No      Return to clinic 8 weeks      Continue with the support of the clinic, reassurance, and redirection. Staff monitoring and ongoing assessments per team plan. This team will utilize appropriate emergency services if necessary. I will make myself available if concerns or problems arise.  The patient agrees to call/message before her next visit with any questions, concerns or problems.    Progress Note:           The patient returns to the concussion clinic for a follow up visit, She was last seen by me on 2/15/22, where no medication changes were made.  Patient reports that she is doing well and did not have any headaches up to a couple weeks ago.  She started to have more neck pain.  Since neck pain started the patient also has been experiencing more headaches.  Patient is reporting worsening in many of her symptoms, this most likely is due to the neck pain and hopefully symptoms will improve as patient gets better control of her neck pain. Overall patient is reporting worsening in headaches, nausea, dizziness, sensitivity to sound.  He reporting no change in fatigue, difficulty concentrating, difficulty remembering, and waking feeling rested.  Patient reports improvement in all other physical, cognitive, and emotional symptoms    Subjective:          Overall improvement from last visit   No    Headaches:  Significant ongoing headaches No    Headaches: Worsen  Improvement :No   Current Headache No   Wake with HA  No     Worse Headache   5/10           How often: have not had any headaches   Average Headache 2/10.    Best Headache 0/10.  Brings on HA/Makes symptoms worse:   Nothing specific  Makes symptoms better. Rest  Taking  acetaminophen (Tylenol) and ibuprofen (Advil)        Helpful:  Yes     Physical Symptoms:  Headache-Yes    Since last visit  Worsen    Nausea-Yes         Since last visit  Worsen      Balance problems - No      Dizziness - Yes            Since last visit  Worsen         Visual problems - No    Since last visit  Improved    Fatigue - Yes             Since last visit  Same     Sensitivity to light - No        Since last visit  Improved     Sensitivity to sound - Yes       Since last visit  Worsen - Tinnitus   Numbness/tingling - No             Cognitive Symptoms  Feeling mentally foggy -No        Since last visit  Improved     Feeling slowed down -No        Since last visit  Improved      Difficulty Concentrating- Yes      Since last visit  Same  Difficulty remembering - Yes        Since last visit  Same     Emotional Symptoms  Irritability - No          Sadness-  No          More emotional - No       ,  Nervousness/anxiety -No      Since last visit  Improved       Sleep History:  Sleep less than usual - No    Sleep more than usual - No     Trouble falling asleep - No       Since last visit  Same     Trouble staying asleep - No       Since last visit  Improved     Wake feeling rested - Yes         Since last visit  Same        Migraine Headaches      Patient history of migraines.   No        Exertion:         Do the above stated symptoms worsen with physical activity? No        Since last visit  Same           Do the above stated symptoms worsen with cognitive activity? Yes       Since last visit  Same            Work/School        Do the above stated symptoms worsen with school/work?        No          Have your returned to work/school? Yes  Full time    Yes      Number of hours a day   8-10      Number of days a week   5     Objective:          Patient Active Problem List    Diagnosis Date Noted     Hypertension 09/22/2021     Priority: Medium     Anxiety 12/22/2020     Priority: Medium     Panic attack 11/19/2020     Priority: Medium     Malignant neoplasm of upper-outer quadrant of left breast in female, estrogen receptor positive (H) 05/23/2019     Priority: Medium     Intramural leiomyoma of uterus 08/02/2016     Priority: Medium     Major depression, recurrent (H) 04/28/2010     Priority: Medium     Formatting of this note might be different from the original.  Onset in childhood, depression marked at age 20, loss of mother.  Several depressions since then.   Previously zoloft. Currently seeing a psychiatrist. On Cymbalta       Tension headache 02/24/2009     Priority: Medium     iamdcSPRAIN THORACIC REGION 02/17/2006     Priority: Medium     Sprain and strain of shoulder and upper arm  02/17/2006     Priority: Medium     Problem list name updated by automated process. Provider to review       Nonallopathic lesion of cervical region 02/17/2006     Priority: Medium     Problem list name updated by automated process. Provider to review       terrence CERVICALGIA 01/03/2006     Priority: Medium     terrnece MV COLLISION NOS- 01/03/2006     Priority: Medium     Past Medical History:   Diagnosis Date     Anxiety state, unspecified      Other psoriasis      Past Surgical History:   Procedure Laterality Date     SURGICAL HISTORY OF -   1999    s/p ganglion cyst L wrist     SURGICAL HISTORY OF -       s/p LEEP  - HPV     Family History   Problem Relation Age of Onset     Hypertension Father      Lipids Father      Hypertension Maternal Grandmother      Heart Disease Maternal Grandmother         heart attack     Breast Cancer Mother      Depression Mother      Breast Cancer Paternal Aunt      Arthritis Maternal Grandfather      Cancer Maternal Aunt         hodgekins     Circulatory Paternal Grandmother      Depression Maternal Aunt      Thyroid Disease Paternal Aunt      Current Outpatient Medications   Medication Sig Dispense Refill     buPROPion (WELLBUTRIN XL) 150 MG 24 hr tablet Take 1 tablet (150 mg) by mouth every morning 90 tablet 1     busPIRone (BUSPAR) 10 MG tablet Take 5 mg by mouth 2 times daily       escitalopram (LEXAPRO) 20 MG tablet Take 20 mg by mouth daily       hydrochlorothiazide (MICROZIDE) 12.5 MG capsule Take 12.5 mg by mouth daily       hydrOXYzine (VISTARIL) 25 MG capsule Take 25 mg by mouth as needed       metoprolol succinate ER (TOPROL-XL) 25 MG 24 hr tablet Take 12.5 mg by mouth daily       Omega-3 Fatty Acids (THE VERY FINEST FISH OIL) LIQD Take 15 mLs by mouth daily       tamoxifen (NOLVADEX) 20 MG tablet Take 1 tablet by mouth daily       TRAZODONE HCL PO        ValACYclovir HCl (VALTREX PO)        VIT BALANCED B-100 OR TABS        Social History     Socioeconomic History      Marital status: Single     Spouse name: Not on file     Number of children: Not on file     Years of education: Not on file     Highest education level: Not on file   Occupational History     Not on file   Tobacco Use     Smoking status: Never Smoker     Smokeless tobacco: Never Used   Substance and Sexual Activity     Alcohol use: Yes     Comment: 3 times per week - wine     Drug use: Not Currently     Sexual activity: Not Currently   Other Topics Concern     Parent/sibling w/ CABG, MI or angioplasty before 65F 55M? Not Asked   Social History Narrative     Not on file     Social Determinants of Health     Financial Resource Strain: Not on file   Food Insecurity: Not on file   Transportation Needs: Not on file   Physical Activity: Not on file   Stress: Not on file   Social Connections: Not on file   Intimate Partner Violence: Not on file   Housing Stability: Not on file       ALLERGIES  No known allergies    The following portions of the patient's history were reviewed and updated as appropriate: allergies, current medications, past family history, past medical history, past social history, past surgical history and problem list.    Review of Systems  A comprehensive review of systems was negative except for: What is noted above    Mental Status Examination  Alertness:  alert  and oriented  Appearance:  well groomed  Behavior/Demeanor:  cooperative, pleasant and calm, with good  eye contact.  Speech:  normal and regular rate and rhythm  Psychomotor:  normal or unremarkable    Mood:  Good  Affect:  full range and appropriate and was congruent to speech content.  Thought Process/Associations: unremarkable   Thought Content: denies delusions [details in Interim History] and delusions.   Perception: denies hallucination  Insight:  good.  Judgment: good.  Attention/Concentration:  Fair  Language:  Intact  Fund of Knowledge:  Average.    Memory:  Immediate recall intact, Short-term memory intact and Long-term memory  intact.      Counseling:   Discussion was held with the patient today regarding concussion in general including types of injury, symptoms that are common, treatment and variability in time to recover  I have reassured the patient her symptoms are very common when a concussion is present and will improve with time. We discussed the risks and benefits of possible medication used to help concussive symptoms including risk of worsening depression with medication adjustments and even the possibility of emergence of suicidal ideations. We will assess for the appropriateness of possible psychotropic medication trials/changes. The patient will seek out appropriate emergency services should that become necessary. The patient agrees to call/message before her next visit with any questions, concerns or problems.    Visit Details:     Type of service: Video Visit    Video Start Time: 0840    Video End Time:  0900    Total time of video visit: 20 minutes    Originating Location: Patient's home    Distant Location:  [facility]    Mode of Communication: Video Conference via [facility] and [employer]    Diagnosis managed and treated at today's visit :  Post concussion syndrome  Post concussion headache  Nausea  Dizziness  Fatigue  Insomnia  Sensitivity to light  Sound sensitivity  Concentration and Attention deficit  Memory difficulties  Anxiety d/t a medical condition  Irritability  Return to work    Total time spent with the patient today was 30 minutes with greater than 50% of the time spent in counseling and care coordination.     10 minutes spent on the date of the encounter doing chart review, review of outside records, review of test results, interpretation of tests and documentation    General Information:     Today you had your appointment with Syeda Nagy CNP     If lab work was done today as part of your evaluation you will generally be contacted via My Chart, mail, or phone with  the results within 1-5 days. If there is an alarming result we will contact you by phone. Lab results come back at varying times, I generally wait until all labs are resulted before making comments on results. Please note labs are automatically released to My Chart once available.     If you need refills please contact your pharmacist. They will send a refill request to me to review. Please allow 3 business days for us to process all refill requests.     Please call or send a medical message through My Chart, with any questions or concerns    If you need any paperwork completed please fax forms to [phone]. Please state if you would like a copy of the completed paperwork, mailed or faxed back to the patient and a fax number to fax the paperwork to. Please allow up to 10 business days for paperwork to be completed.      MAGDALENA Harrington, [facility] [place] [place] [facility]  [facility] [address]  [address] [place] [id number]  Office: [phone]  Fax: [phone]

## 2022-03-17 ENCOUNTER — TELEPHONE (OUTPATIENT)
Dept: NEUROLOGY | Facility: CLINIC | Age: 52
End: 2022-03-17
Payer: COMMERCIAL

## 2022-03-17 DIAGNOSIS — F07.81 POST CONCUSSION SYNDROME: Primary | ICD-10-CM

## 2022-03-17 NOTE — TELEPHONE ENCOUNTER
Called patient at Syeda's request.  Pt was wondering if she could do cranio-sacral therapy at [facility] at their clinic on [address] [facility] in [place].  [facility] was contacted to find out and we were told that they did have that service in [place].  Will send referral there per pt request.    Pt had 2 medication questions for Syeda:    1. Pt stated that her psychiatrist recommended that she increase her dose of Bupropion from 150 to 300 mg. Pt is wondering if Syeda agrees with the increase.  Psychiatrist rational to increase was due to an increase in patients headaches.     2. Pt also stated she is on Tamoxifen.  Per pt report certain medications can decrease the effectiveness of Tamoxifen.  Pt is wondering if Wellbutrin is one of those drugs.    Reid Nicole, KEY ATC on 3/17/2022 at 1:10 PM

## 2022-03-21 ENCOUNTER — TELEPHONE (OUTPATIENT)
Dept: NEUROLOGY | Facility: CLINIC | Age: 52
End: 2022-03-21
Payer: COMMERCIAL

## 2022-03-21 NOTE — TELEPHONE ENCOUNTER
Pt returned call. I relayed Syeda's message to the pt. Per pt has not heard from anyone about scheduling an appt for the cranial sacral massage so I gave the pt the number to [phone]. Pt had no other questions.    ELIZ Simon on 3/21/2022 at 10:47 AM

## 2022-03-21 NOTE — TELEPHONE ENCOUNTER
Left message with Patient to relay Syeda's message.     Reid Nicole, LAT ATC on 3/21/2022 at 10:19 AM

## 2022-03-21 NOTE — TELEPHONE ENCOUNTER
Left message with pt to talk about cranio sacral therapy options.    Reid Nicole, LAT ATC on 3/21/2022 at 1:35 PM

## 2022-03-21 NOTE — TELEPHONE ENCOUNTER
Patient called and is requesting a referral for Craniosacral  Therapy. She has called the [facility] for this therapy they no longer do this therapy.     Patient is looking for a referral in or out of [facility] please call patient and advise.       Thank you

## 2022-03-21 NOTE — TELEPHONE ENCOUNTER
Per Syeda,    Yes send referral for cranial sacral massage, yes for increase of Wellbutrin  ask her to talk with her pharmacist about if the Wellbutrin will effect her other meds he knows the most about med interactions

## 2022-03-22 NOTE — TELEPHONE ENCOUNTER
Spoke with patient about cranio sacral massage.  She stated that she confirmed that [facility] does not do [facility] anymore.  Will schedule with  [facility] option.     Reid Nicole, KEY ATC on 3/22/2022 at 10:19 AM

## 2022-03-28 ENCOUNTER — TELEPHONE (OUTPATIENT)
Dept: NEUROLOGY | Facility: CLINIC | Age: 52
End: 2022-03-28
Payer: COMMERCIAL

## 2022-03-28 NOTE — TELEPHONE ENCOUNTER
Patient needs a contact number for the cranial massage due to no contact. Please contact the patient with this information. Thanks

## 2022-03-28 NOTE — TELEPHONE ENCOUNTER
Message sent to [facility] for appt scheduling.    Reid Nicole, KEY ATC on 3/28/2022 at 2:13 PM

## 2022-04-18 ENCOUNTER — EVALUATION (OUTPATIENT)
Dept: PHYSICAL THERAPY | Facility: CLINIC | Age: 52
End: 2022-04-18
Payer: COMMERCIAL

## 2022-04-18 DIAGNOSIS — F07.81 CONCUSSION SYNDROME: ICD-10-CM

## 2022-04-18 NOTE — PROGRESS NOTES
Physical Therapy Initial Evaluation  Subjective:  The history is provided by the patient.   Patient Health History  Negar Jacobs being seen for Pt admits concussion 9/2021, past 2 months more neck pain, HAs and other sxs .     Problem began: 9/1/2021.   Problem occurred: fall and hit head/forehead    Pain is reported as 5/10 on pain scale.  General health as reported by patient is excellent.  Pertinent medical history includes: cancer, concussions/dizziness, depression, high blood pressure, menopausal and numbness/tingling. Other medical history details: breast CA .        Surgeries include:  Cancer surgery and orthopedic surgery. Other surgery history details: L45 fusion , fibroids .    Current medications:  Anti-depressants and high blood pressure medication.    Current occupation is Human Resources .   Primary job tasks include:  Computer work and prolonged sitting.                  Therapist Generated HPI Evaluation  Problem details: Pt reports past 2 months she has had neck pain, HAs, pain in back of her head, other concussion sxs. She is interested in craniosacral therapy. Her neck feels like ropes late in day, HAs come on at end of work day. She denies trouble sleeping. .         Type of problem:  Cervical spine.    This is a new condition.  Condition occurred with:  A fall/slip.  Where condition occurred: at home.  Patient reports pain:  Upper cervical spine, mid cervical spine and lower cervical spine.  Pain is described as aching, stabbing and sharp and is intermittent.  Pain is worse in the P.M..  Since onset symptoms are gradually worsening.  Associated symptoms:  Headache, dizziness and loss of motion/stiffness. Symptoms are exacerbated by certain positions and sitting  and relieved by nothing.      Restrictions due to condition include:  Working in normal job without restrictions.  Barriers include:  None as reported by patient.                        Objective:  System              Cervical/Thoracic  Evaluation    AROM:  AROM Cervical:    Flexion:          Nil   Extension:       Mod loss retxn, extens   Rotation:         Left: nil      Right: nil   Side Bend:      Left:     Right:       Headaches: cervical  Cervical Myotomes:  normal                  DTR's:  not assessed          Cervical Dermatomes:  not assessed                    Cervical Palpation:    Tenderness present at Left:    Upper Trap; Levator; Erector Spinae and Suboccipitals  Tenderness present at Right:    Upper Trap; Levator; Erector Spinae and Suboccipitals        Cord Sign:  not assessed                                            General     ROS    Assessment/Plan:    Patient is a 51 year old female with cervical complaints.    Patient has the following significant findings with corresponding treatment plan.                Diagnosis 1:  Concussion syndr   Pain -  manual therapy, self management and home program  Decreased ROM/flexibility - manual therapy, therapeutic exercise and home program  Decreased joint mobility - manual therapy and therapeutic exercise  Impaired balance - neuro re-education and therapeutic activities  Decreased proprioception - neuro re-education and therapeutic activities  Impaired muscle performance - neuro re-education  Decreased function - therapeutic activities  Vestibular- manual therapy and neuro re-ed     Therapy Evaluation Codes:   1) History comprised of:   Personal factors that impact the plan of care:      Coping style, Overall behavior pattern and Past/current experiences.    Comorbidity factors that impact the plan of care are:      Cancer, Depression and High blood pressure.     Medications impacting care: Anti-depressant, High blood pressure and Pain.  2) Examination of Body Systems comprised of:   Body structures and functions that impact the plan of care:      Cervical spine and vestibular .   Activity limitations that impact the plan of care are:      Bending, Dressing and Reading/Computer  work.  3) Clinical presentation characteristics are:   Stable/Uncomplicated.  4) Decision-Making    Low complexity using standardized patient assessment instrument and/or measureable assessment of functional outcome.  Cumulative Therapy Evaluation is: Low complexity.    Previous and current functional limitations:  (See Goal Flow Sheet for this information)    Short term and Long term goals: (See Goal Flow Sheet for this information)     Communication ability:  Patient appears to be able to clearly communicate and understand verbal and written communication and follow directions correctly.  Treatment Explanation - The following has been discussed with the patient:   RX ordered/plan of care  Anticipated outcomes  Possible risks and side effects  This patient would benefit from PT intervention to resume normal activities.   Rehab potential is good.    Frequency:  1 X week, once daily  Duration:  for 12 weeks  Discharge Plan:  Achieve all LTG.  Independent in home treatment program.  Reach maximal therapeutic benefit.    Please refer to the daily flowsheet for treatment today, total treatment time and time spent performing 1:1 timed codes.

## 2022-05-03 ENCOUNTER — OFFICE VISIT (OUTPATIENT)
Dept: PHYSICAL THERAPY | Facility: CLINIC | Age: 52
End: 2022-05-03
Payer: COMMERCIAL

## 2022-05-03 DIAGNOSIS — F07.81 CONCUSSION SYNDROME: Primary | ICD-10-CM

## 2022-05-03 PROCEDURE — 97140 MANUAL THERAPY 1/> REGIONS: CPT | Mod: GP | Performed by: PHYSICAL THERAPIST

## 2022-05-09 ENCOUNTER — OFFICE VISIT (OUTPATIENT)
Dept: PHYSICAL THERAPY | Facility: CLINIC | Age: 52
End: 2022-05-09
Payer: COMMERCIAL

## 2022-05-09 DIAGNOSIS — F07.81 CONCUSSION SYNDROME: Primary | ICD-10-CM

## 2022-05-09 PROCEDURE — 97140 MANUAL THERAPY 1/> REGIONS: CPT | Mod: GP | Performed by: PHYSICAL THERAPIST

## 2022-05-09 PROCEDURE — 97112 NEUROMUSCULAR REEDUCATION: CPT | Mod: GP | Performed by: PHYSICAL THERAPIST

## 2022-05-10 ENCOUNTER — VIRTUAL VISIT (OUTPATIENT)
Dept: NEUROLOGY | Facility: CLINIC | Age: 52
End: 2022-05-10
Payer: COMMERCIAL

## 2022-05-10 DIAGNOSIS — G44.309 POST-CONCUSSION HEADACHE: ICD-10-CM

## 2022-05-10 DIAGNOSIS — M54.2 NECK PAIN: ICD-10-CM

## 2022-05-10 DIAGNOSIS — F07.81 POST CONCUSSION SYNDROME: Primary | ICD-10-CM

## 2022-05-10 PROCEDURE — 99417 PROLNG OP E/M EACH 15 MIN: CPT | Mod: GT | Performed by: NURSE PRACTITIONER

## 2022-05-10 PROCEDURE — 99215 OFFICE O/P EST HI 40 MIN: CPT | Mod: GT | Performed by: NURSE PRACTITIONER

## 2022-05-10 NOTE — LETTER
"    5/10/2022         RE: Negar Jacobs  [address] [address]        Dear Colleague,    Thank you for referring your patient, Negar Jacobs, to the [facility]. Please see a copy of my visit note below.     Video Visit: Concussion Follow up:     Negar Jacobs is a 51 year old female who is being evaluated via a billable video visit in light of the ongoing global health crisis (COVID-19) that requires us to abide by social distancing mandates in order to reduce the risk of COVID-19 exposure.       The patient has been notified of the following:     \"This video visit will be conducted via a video call between you and your physician/provider. We have found that certain health care needs can be provided without the need for a physical exam.  This service lets us provide the care you need with a short phone/video conversation.  If a prescription is necessary we can send it directly to your pharmacy.  If lab work is needed we can place an order for that and you can then stop by our lab to have the test done at a later time.    If during the course of the call the physician/provider feels a telephone visit is not appropriate, you will not be charged for this service.\"     Patient has given verbal consent to a video visit? Yes      Visit Check In:     Orders from previous visit: None  Neuropsychological assessment completed    No   Currently doing PT  Yes    Completed No   Currently doing OT  No    Completed No   Currently doing ST   No    Completed No   Psychology  Yes   Return to Work/School   Full scheduled hours  Yes      Increase in hours since last visit    Yes   Number of hours a day 8-10   Number of days a week   5        Need a note for work/school accommodations  No     Any new medication (other provider):   No   Meds started at last appointment No  Results: N/A  Meds increased/decreased at last appointment    Yes Results: N/A    Currently on medication " to help with sleep    Yes    Trazodone    Currently on any mental health medications     Yes    Lexapro, Buspar      Currently on medication for attention, ADD/ADHD    No        Questions/Concerns?    Continuing to have headaches and neck pain. Would like to talk about getting an image                                                      Workman's Comp   No   QRC   N/A      Start Time: 10:00 am    End Time:  10:04 am    Total time of phone call: 4 minutes    Patient would like the video invitation sent by: "[employer]  Number/e-mail address: [phone]     Reid Garnica, ATC , [facility] ATC    Outpatient Follow up Mild TBI (Concussion)  Evaluation:     Negar Jacobs chief complaint is Post Concussion Syndrome     Is patient on a controlled substance prescribed by me?  No      HPI:        Pertinent History:  Per EHR: Negar Jacobs is a 51 year old female with history of anxiety, depression, and hypertension who presents with head injury. Four days ago, the patient tripped over a pair of shoes in the bathroom and hit her forehead on a piece of marble above the radiator. She did not lose consciousness but said it felt like she had the wind knocked out of her. Since then, she has been experiencing slight head pressure, minor forgetfulness, and describes not feeling quite like herself. She denies nausea, vomiting, visual changes, weakness, numbness, and changes in appetite.     Date of accident :  10/18/21    Plan:          We discussed some treatment options and have elected to have patient message me if the facility of her choice and new order with be faxed to facility. Wellbutrin will be d/c'd. Patient will ask endocronologist if a stimulate would contradict her cancer med. MRI of head and neck. Patient will also think about taking a leave from work. I will discuss patient's concern with PT with manager    Medication Adjustment:  No medication changes    Return to Work/School   Full scheduled hours  Yes      Note completed     No      Return to clinic 8 weeks      Continue with the support of the clinic, reassurance, and redirection. Staff monitoring and ongoing assessments per team plan. This team will utilize appropriate emergency services if necessary. I will make myself available if concerns or problems arise.  The patient agrees to call/message before her next visit with any questions, concerns or problems.    Progress Note:          The patient returns to the concussion clinic for a follow up visit, She was last seen by me on 3/15/22, where no medication changes were made.  Patient did talk with her endocrinologist and he wanted the patient to stop the Wellbutrin due to its interaction with her cancer medication.  Patient reports very uncomfortable with her physical therapist, she will cancel all future appointments. She will call [facility] to see if they do offer craniosacral massage and I will fax new order.  Patient reports being frustrated with the time it is taking her to heal from her concussion.  She is having increased anxiety due to thinking there is something wrong with her head and that since there has been little improvement.  Overall patient is reporting improvement in nausea, visual issues, sensitivity to light, mental fogginess, brain slowing and problems staying asleep.  She reporting worsening in anxiety.  She reports no change in all other physical, cognitive, and emotional symptoms    Subjective:          Overall improvement from last visit   No    Headaches:  Significant ongoing headaches Yes    Headaches: Worsen  Improvement :No   Current Headache Yes   Wake with HA  Yes    Worse Headache   5/10           How often: Everyday  Average Headache 2/10.    Best Headache 0/10.  Brings on HA/Makes symptoms worse:   Nothing specific  Makes symptoms better. Rest  Taking  acetaminophen (Tylenol) and ibuprofen (Advil)        Helpful:  Yes     Physical Symptoms:  Headache-Yes    Since last visit  Same   Nausea-No         Since  last visit  Improved       Balance problems - No      Dizziness - Yes            Since last visit  Same         Visual problems - No    Since last visit  Improved    Fatigue - Yes             Since last visit  Same     Sensitivity to light - No        Since last visit  Improved     Sensitivity to sound - Yes       Since last visit  Same - Tinnitus   Numbness/tingling - No             Cognitive Symptoms  Feeling mentally foggy -No        Since last visit  Improved     Feeling slowed down -No        Since last visit  Improved     Difficulty Concentrating- Yes      Since last visit  Same  Difficulty remembering - Yes        Since last visit  Same     Emotional Symptoms  Irritability - No          Sadness-  No          More emotional - No       ,  Nervousness/anxiety -No      Since last visit worsening    Sleep History:  Sleep less than usual - No    Sleep more than usual - No     Trouble falling asleep - No       Since last visit  Same     Trouble staying asleep - No       Since last visit  Improved     Wake feeling rested - Yes         Since last visit  Same        Migraine Headaches      Patient history of migraines.   No        Exertion:         Do the above stated symptoms worsen with physical activity? No        Since last visit  Same           Do the above stated symptoms worsen with cognitive activity? Yes       Since last visit  Same            Work/School        Do the above stated symptoms worsen with school/work?        No          Have your returned to work/school? Yes  Full time    Yes      Number of hours a day   8-10      Number of days a week   5     Objective:          Patient Active Problem List    Diagnosis Date Noted     Concussion syndrome 04/18/2022     Priority: Medium     Hypertension 09/22/2021     Priority: Medium     Anxiety 12/22/2020     Priority: Medium     Panic attack 11/19/2020     Priority: Medium     Malignant neoplasm of upper-outer quadrant of left breast in female, estrogen  receptor positive (H) 05/23/2019     Priority: Medium     Intramural leiomyoma of uterus 08/02/2016     Priority: Medium     Major depression, recurrent (H) 04/28/2010     Priority: Medium     Formatting of this note might be different from the original.  Onset in childhood, depression marked at age 20, loss of mother.  Several depressions since then.   Previously zoloft. Currently seeing a psychiatrist. On Cymbalta       Tension headache 02/24/2009     Priority: Medium     iamdcSPRAIN THORACIC REGION 02/17/2006     Priority: Medium     Sprain and strain of shoulder and upper arm 02/17/2006     Priority: Medium     Problem list name updated by automated process. Provider to review       Nonallopathic lesion of cervical region 02/17/2006     Priority: Medium     Problem list name updated by automated process. Provider to review       terrence CERVICALGIA 01/03/2006     Priority: Medium     terrence MV COLLISION NOS- 01/03/2006     Priority: Medium     Past Medical History:   Diagnosis Date     Anxiety state, unspecified      Other psoriasis      Past Surgical History:   Procedure Laterality Date     SURGICAL HISTORY OF -   1999    s/p ganglion cyst L wrist     SURGICAL HISTORY OF -       s/p LEEP  - HPV     Family History   Problem Relation Age of Onset     Hypertension Father      Lipids Father      Hypertension Maternal Grandmother      Heart Disease Maternal Grandmother         heart attack     Breast Cancer Mother      Depression Mother      Breast Cancer Paternal Aunt      Arthritis Maternal Grandfather      Cancer Maternal Aunt         hodgekins     Circulatory Paternal Grandmother      Depression Maternal Aunt      Thyroid Disease Paternal Aunt      Current Outpatient Medications   Medication Sig Dispense Refill     buPROPion (WELLBUTRIN XL) 150 MG 24 hr tablet Take 1 tablet (150 mg) by mouth every morning 90 tablet 1     busPIRone (BUSPAR) 10 MG tablet Take 5 mg by mouth 2 times daily       escitalopram (LEXAPRO)  20 MG tablet Take 20 mg by mouth daily       hydrochlorothiazide (MICROZIDE) 12.5 MG capsule Take 12.5 mg by mouth daily       hydrOXYzine (VISTARIL) 25 MG capsule Take 25 mg by mouth as needed       metoprolol succinate ER (TOPROL-XL) 25 MG 24 hr tablet Take 12.5 mg by mouth daily       Omega-3 Fatty Acids (THE VERY FINEST FISH OIL) LIQD Take 15 mLs by mouth daily       tamoxifen (NOLVADEX) 20 MG tablet Take 1 tablet by mouth daily       TRAZODONE HCL PO        ValACYclovir HCl (VALTREX PO)        VIT BALANCED B-100 OR TABS        Social History     Socioeconomic History     Marital status: Single     Spouse name: Not on file     Number of children: Not on file     Years of education: Not on file     Highest education level: Not on file   Occupational History     Not on file   Tobacco Use     Smoking status: Never Smoker     Smokeless tobacco: Never Used   Substance and Sexual Activity     Alcohol use: Yes     Comment: 3 times per week - wine     Drug use: Not Currently     Sexual activity: Not Currently   Other Topics Concern     Parent/sibling w/ CABG, MI or angioplasty before 65F 55M? Not Asked   Social History Narrative     Not on file     Social Determinants of Health     Financial Resource Strain: Not on file   Food Insecurity: Not on file   Transportation Needs: Not on file   Physical Activity: Not on file   Stress: Not on file   Social Connections: Not on file   Intimate Partner Violence: Not on file   Housing Stability: Not on file       ALLERGIES  No known allergies    The following portions of the patient's history were reviewed and updated as appropriate: allergies, current medications, past family history, past medical history, past social history, past surgical history and problem list.    Review of Systems  A comprehensive review of systems was negative except for: What is noted above    Mental Status Examination  Alertness:  alert  and oriented  Appearance:  well groomed  Behavior/Demeanor:   cooperative, pleasant and calm, with good  eye contact.  Speech:  normal and regular rate and rhythm  Psychomotor:  normal or unremarkable    Mood:  Good  Affect:  full range and appropriate and was congruent to speech content.  Thought Process/Associations: unremarkable   Thought Content: denies delusions [details in Interim History] and delusions.   Perception: denies hallucination  Insight:  good.  Judgment: good.  Attention/Concentration:  Fair  Language:  Intact  Fund of Knowledge:  Average.    Memory:  Immediate recall intact, Short-term memory intact and Long-term memory intact.      Counseling:   Discussion was held with the patient today regarding concussion in general including types of injury, symptoms that are common, treatment and variability in time to recover  I have reassured the patient her symptoms are very common when a concussion is present and will improve with time. We discussed the risks and benefits of possible medication used to help concussive symptoms including risk of worsening depression with medication adjustments and even the possibility of emergence of suicidal ideations. We will assess for the appropriateness of possible psychotropic medication trials/changes. The patient will seek out appropriate emergency services should that become necessary. The patient agrees to call/message before her next visit with any questions, concerns or problems.    Visit Details:     Type of service: Video Visit    Video Start Time: 1005    Video End Time:  1100    Total time of video visit: 55 minutes    Originating Location: Patient's home    Distant Location:  [facility]    Mode of Communication: Video Conference via [facility] and [employer]    Diagnosis managed and treated at today's visit :  Post concussion syndrome  Post concussion headache  Nausea  Dizziness  Fatigue  Insomnia  Sensitivity to light  Sound sensitivity  Concentration and Attention deficit  Memory  difficulties  Anxiety d/t a medical condition  Irritability  Return to work    Total time spent with the patient today was 70 minutes with greater than 50% of the time spent in counseling and care coordination.     10 minutes spent on the date of the encounter doing chart review, review of outside records, review of test results, interpretation of tests and documentation and return to work letter    General Information:     Today you had your appointment with Syeda Nagy CNP     If lab work was done today as part of your evaluation you will generally be contacted via My Chart, mail, or phone with the results within 1-5 days. If there is an alarming result we will contact you by phone. Lab results come back at varying times, I generally wait until all labs are resulted before making comments on results. Please note labs are automatically released to My Chart once available.     If you need refills please contact your pharmacist. They will send a refill request to me to review. Please allow 3 business days for us to process all refill requests.     Please call or send a medical message through My Chart, with any questions or concerns    If you need any paperwork completed please fax forms to [phone]. Please state if you would like a copy of the completed paperwork, mailed or faxed back to the patient and a fax number to fax the paperwork to. Please allow up to 10 business days for paperwork to be completed.      MAGDALENA Harrington, CNP      [facility]  [facility] [address]  [address] [place] [id number]  Office: [phone]  Fax: [phone]          Again, thank you for allowing me to participate in the care of your patient.        Sincerely,        MAGDALENA Harrington CNP

## 2022-05-10 NOTE — PROGRESS NOTES
" Video Visit: Concussion Follow up:     Negar Jacobs is a 51 year old female who is being evaluated via a billable video visit in light of the ongoing global health crisis (COVID-19) that requires us to abide by social distancing mandates in order to reduce the risk of COVID-19 exposure.       The patient has been notified of the following:     \"This video visit will be conducted via a video call between you and your physician/provider. We have found that certain health care needs can be provided without the need for a physical exam.  This service lets us provide the care you need with a short phone/video conversation.  If a prescription is necessary we can send it directly to your pharmacy.  If lab work is needed we can place an order for that and you can then stop by our lab to have the test done at a later time.    If during the course of the call the physician/provider feels a telephone visit is not appropriate, you will not be charged for this service.\"     Patient has given verbal consent to a video visit? Yes      Visit Check In:     Orders from previous visit: None  Neuropsychological assessment completed    No   Currently doing PT  Yes    Completed No   Currently doing OT  No    Completed No   Currently doing ST   No    Completed No   Psychology  Yes   Return to Work/School   Full scheduled hours  Yes      Increase in hours since last visit    Yes   Number of hours a day 8-10   Number of days a week   5        Need a note for work/school accommodations  No     Any new medication (other provider):   No   Meds started at last appointment No  Results: N/A  Meds increased/decreased at last appointment    Yes Results: N/A    Currently on medication to help with sleep    Yes    Trazodone    Currently on any mental health medications     Yes    Lexapro, Buspar      Currently on medication for attention, ADD/ADHD    No        Questions/Concerns?    Continuing to have headaches and neck pain. Would like to talk " about getting an image                                                      Workman's Comp   No   [facility]   N/A      Start Time: 10:00 am    End Time:  10:04 am    Total time of phone call: 4 minutes    Patient would like the video invitation sent by: [email or web address]  Number/e-mail address: [phone]     Reid Garnica, ATC , LAT ATC    Outpatient Follow up Mild TBI (Concussion)  Evaluation:     Negar Jacobs chief complaint is Post Concussion Syndrome     Is patient on a controlled substance prescribed by me?  No      HPI:        Pertinent History:  Per EHR: Negar Jacobs is a 51 year old female with history of anxiety, depression, and hypertension who presents with head injury. Four days ago, the patient tripped over a pair of shoes in the bathroom and hit her forehead on a piece of marble above the radiator. She did not lose consciousness but said it felt like she had the wind knocked out of her. Since then, she has been experiencing slight head pressure, minor forgetfulness, and describes not feeling quite like herself. She denies nausea, vomiting, visual changes, weakness, numbness, and changes in appetite.     Date of accident :  10/18/21    Plan:          We discussed some treatment options and have elected to have patient message me if the facility of her choice and new order with be faxed to facility. Wellbutrin will be d/c'd. Patient will ask endocronologist if a stimulate would contradict her cancer med. MRI of head and neck. Patient will also think about taking a leave from work. I will discuss patient's concern with PT with manager    Medication Adjustment:  No medication changes    Return to Work/School   Full scheduled hours  Yes      Note completed    No      Return to clinic 8 weeks      Continue with the support of the clinic, reassurance, and redirection. Staff monitoring and ongoing assessments per team plan. This team will utilize appropriate emergency services if necessary. I will make myself available  if concerns or problems arise.  The patient agrees to call/message before her next visit with any questions, concerns or problems.    Progress Note:          The patient returns to the concussion clinic for a follow up visit, She was last seen by me on 3/15/22, where no medication changes were made.  Patient did talk with her endocrinologist and he wanted the patient to stop the Wellbutrin due to its interaction with her cancer medication.  Patient reports very uncomfortable with her physical therapist, she will cancel all future appointments. She will call [facility] to see if they do offer craniosacral massage and I will fax new order.  Patient reports being frustrated with the time it is taking her to heal from her concussion.  She is having increased anxiety due to thinking there is something wrong with her head and that since there has been little improvement.  Overall patient is reporting improvement in nausea, visual issues, sensitivity to light, mental fogginess, brain slowing and problems staying asleep.  She reporting worsening in anxiety.  She reports no change in all other physical, cognitive, and emotional symptoms    Subjective:          Overall improvement from last visit   No    Headaches:  Significant ongoing headaches Yes    Headaches: Worsen  Improvement :No   Current Headache Yes   Wake with HA  Yes    Worse Headache   5/10           How often: Everyday  Average Headache 2/10.    Best Headache 0/10.  Brings on HA/Makes symptoms worse:   Nothing specific  Makes symptoms better. Rest  Taking  acetaminophen (Tylenol) and ibuprofen (Advil)        Helpful:  Yes     Physical Symptoms:  Headache-Yes    Since last visit  Same   Nausea-No         Since last visit  Improved       Balance problems - No      Dizziness - Yes            Since last visit  Same         Visual problems - No    Since last visit  Improved    Fatigue - Yes             Since last visit  Same     Sensitivity to light - No        Since last  visit  Improved     Sensitivity to sound - Yes       Since last visit  Same - Tinnitus   Numbness/tingling - No             Cognitive Symptoms  Feeling mentally foggy -No        Since last visit  Improved     Feeling slowed down -No        Since last visit  Improved     Difficulty Concentrating- Yes      Since last visit  Same  Difficulty remembering - Yes        Since last visit  Same     Emotional Symptoms  Irritability - No          Sadness-  No          More emotional - No       ,  Nervousness/anxiety -No      Since last visit worsening    Sleep History:  Sleep less than usual - No    Sleep more than usual - No     Trouble falling asleep - No       Since last visit  Same     Trouble staying asleep - No       Since last visit  Improved     Wake feeling rested - Yes         Since last visit  Same        Migraine Headaches      Patient history of migraines.   No        Exertion:         Do the above stated symptoms worsen with physical activity? No        Since last visit  Same           Do the above stated symptoms worsen with cognitive activity? Yes       Since last visit  Same            Work/School        Do the above stated symptoms worsen with school/work?        No          Have your returned to work/school? Yes  Full time    Yes      Number of hours a day   8-10      Number of days a week   5     Objective:          Patient Active Problem List    Diagnosis Date Noted     Concussion syndrome 04/18/2022     Priority: Medium     Hypertension 09/22/2021     Priority: Medium     Anxiety 12/22/2020     Priority: Medium     Panic attack 11/19/2020     Priority: Medium     Malignant neoplasm of upper-outer quadrant of left breast in female, estrogen receptor positive (H) 05/23/2019     Priority: Medium     Intramural leiomyoma of uterus 08/02/2016     Priority: Medium     Major depression, recurrent (H) 04/28/2010     Priority: Medium     Formatting of this note might be different from the original.  Onset in  childhood, depression marked at age 20, loss of mother.  Several depressions since then.   Previously zoloft. Currently seeing a psychiatrist. On Cymbalta       Tension headache 02/24/2009     Priority: Medium     iamdcSPRAIN THORACIC REGION 02/17/2006     Priority: Medium     Sprain and strain of shoulder and upper arm 02/17/2006     Priority: Medium     Problem list name updated by automated process. Provider to review       Nonallopathic lesion of cervical region 02/17/2006     Priority: Medium     Problem list name updated by automated process. Provider to review       terrence CERVICALGIA 01/03/2006     Priority: Medium     terrence MV COLLISION NOS- 01/03/2006     Priority: Medium     Past Medical History:   Diagnosis Date     Anxiety state, unspecified      Other psoriasis      Past Surgical History:   Procedure Laterality Date     SURGICAL HISTORY OF -   1999    s/p ganglion cyst L wrist     SURGICAL HISTORY OF -       s/p LEEP  - HPV     Family History   Problem Relation Age of Onset     Hypertension Father      Lipids Father      Hypertension Maternal Grandmother      Heart Disease Maternal Grandmother         heart attack     Breast Cancer Mother      Depression Mother      Breast Cancer Paternal Aunt      Arthritis Maternal Grandfather      Cancer Maternal Aunt         hodgekins     Circulatory Paternal Grandmother      Depression Maternal Aunt      Thyroid Disease Paternal Aunt      Current Outpatient Medications   Medication Sig Dispense Refill     buPROPion (WELLBUTRIN XL) 150 MG 24 hr tablet Take 1 tablet (150 mg) by mouth every morning 90 tablet 1     busPIRone (BUSPAR) 10 MG tablet Take 5 mg by mouth 2 times daily       escitalopram (LEXAPRO) 20 MG tablet Take 20 mg by mouth daily       hydrochlorothiazide (MICROZIDE) 12.5 MG capsule Take 12.5 mg by mouth daily       hydrOXYzine (VISTARIL) 25 MG capsule Take 25 mg by mouth as needed       metoprolol succinate ER (TOPROL-XL) 25 MG 24 hr tablet Take 12.5  mg by mouth daily       Omega-3 Fatty Acids (THE VERY FINEST FISH OIL) LIQD Take 15 mLs by mouth daily       tamoxifen (NOLVADEX) 20 MG tablet Take 1 tablet by mouth daily       TRAZODONE HCL PO        ValACYclovir HCl (VALTREX PO)        VIT BALANCED B-100 OR TABS        Social History     Socioeconomic History     Marital status: Single     Spouse name: Not on file     Number of children: Not on file     Years of education: Not on file     Highest education level: Not on file   Occupational History     Not on file   Tobacco Use     Smoking status: Never Smoker     Smokeless tobacco: Never Used   Substance and Sexual Activity     Alcohol use: Yes     Comment: 3 times per week - wine     Drug use: Not Currently     Sexual activity: Not Currently   Other Topics Concern     Parent/sibling w/ CABG, MI or angioplasty before 65F 55M? Not Asked   Social History Narrative     Not on file     Social Determinants of Health     Financial Resource Strain: Not on file   Food Insecurity: Not on file   Transportation Needs: Not on file   Physical Activity: Not on file   Stress: Not on file   Social Connections: Not on file   Intimate Partner Violence: Not on file   Housing Stability: Not on file       ALLERGIES  No known allergies    The following portions of the patient's history were reviewed and updated as appropriate: allergies, current medications, past family history, past medical history, past social history, past surgical history and problem list.    Review of Systems  A comprehensive review of systems was negative except for: What is noted above    Mental Status Examination  Alertness:  alert  and oriented  Appearance:  well groomed  Behavior/Demeanor:  cooperative, pleasant and calm, with good  eye contact.  Speech:  normal and regular rate and rhythm  Psychomotor:  normal or unremarkable    Mood:  Good  Affect:  full range and appropriate and was congruent to speech content.  Thought Process/Associations: unremarkable    Thought Content: denies delusions [details in Interim History] and delusions.   Perception: denies hallucination  Insight:  good.  Judgment: good.  Attention/Concentration:  Fair  Language:  Intact  Fund of Knowledge:  Average.    Memory:  Immediate recall intact, Short-term memory intact and Long-term memory intact.      Counseling:   Discussion was held with the patient today regarding concussion in general including types of injury, symptoms that are common, treatment and variability in time to recover  I have reassured the patient her symptoms are very common when a concussion is present and will improve with time. We discussed the risks and benefits of possible medication used to help concussive symptoms including risk of worsening depression with medication adjustments and even the possibility of emergence of suicidal ideations. We will assess for the appropriateness of possible psychotropic medication trials/changes. The patient will seek out appropriate emergency services should that become necessary. The patient agrees to call/message before her next visit with any questions, concerns or problems.    Visit Details:     Type of service: Video Visit    Video Start Time: 1005    Video End Time:  1100    Total time of video visit: 55 minutes    Originating Location: Patient's home    Distant Location:  [facility]    Mode of Communication: Video Conference via [facility] and [employer]    Diagnosis managed and treated at today's visit :  Post concussion syndrome  Post concussion headache  Nausea  Dizziness  Fatigue  Insomnia  Sensitivity to light  Sound sensitivity  Concentration and Attention deficit  Memory difficulties  Anxiety d/t a medical condition  Irritability  Return to work    Total time spent with the patient today was 70 minutes with greater than 50% of the time spent in counseling and care coordination.     10 minutes spent on the date of the encounter doing  chart review, review of outside records, review of test results, interpretation of tests and documentation and return to work letter    General Information:     Today you had your appointment with Syeda Nagy CNP     If lab work was done today as part of your evaluation you will generally be contacted via My Chart, mail, or phone with the results within 1-5 days. If there is an alarming result we will contact you by phone. Lab results come back at varying times, I generally wait until all labs are resulted before making comments on results. Please note labs are automatically released to My Chart once available.     If you need refills please contact your pharmacist. They will send a refill request to me to review. Please allow 3 business days for us to process all refill requests.     Please call or send a medical message through My Chart, with any questions or concerns    If you need any paperwork completed please fax forms to [phone]. Please state if you would like a copy of the completed paperwork, mailed or faxed back to the patient and a fax number to fax the paperwork to. Please allow up to 10 business days for paperwork to be completed.      MAGDALENA Harrington CNP      [facility]  [facility] [address]  [address] [place] [address]  Office: [phone]  Fax: [phone]

## 2022-05-15 DIAGNOSIS — F07.81 POST CONCUSSION SYNDROME: Primary | ICD-10-CM

## 2022-05-15 DIAGNOSIS — R41.840 ATTENTION AND CONCENTRATION DEFICIT: ICD-10-CM

## 2022-05-15 RX ORDER — METHYLPHENIDATE HYDROCHLORIDE 5 MG/1
5 TABLET ORAL 2 TIMES DAILY
Qty: 60 TABLET | Refills: 0 | Status: SHIPPED | OUTPATIENT
Start: 2022-05-15 | End: 2022-06-30

## 2022-05-16 DIAGNOSIS — M54.2 NECK PAIN: ICD-10-CM

## 2022-05-16 DIAGNOSIS — F07.81 POST CONCUSSION SYNDROME: Primary | ICD-10-CM

## 2022-05-18 ENCOUNTER — TELEPHONE (OUTPATIENT)
Dept: NEUROLOGY | Facility: CLINIC | Age: 52
End: 2022-05-18
Payer: COMMERCIAL

## 2022-05-18 NOTE — TELEPHONE ENCOUNTER
----- Message from MAGDALENA Harrington CNP sent at 5/16/2022 10:38 AM CDT -----  Please fax PT order for cranial sacral massage to [facility] in [place]

## 2022-06-22 ENCOUNTER — HOSPITAL ENCOUNTER (OUTPATIENT)
Dept: MRI IMAGING | Facility: CLINIC | Age: 52
Discharge: HOME | End: 2022-06-22
Attending: NURSE PRACTITIONER
Payer: COMMERCIAL

## 2022-06-22 DIAGNOSIS — G44.309 POST-CONCUSSION HEADACHE: ICD-10-CM

## 2022-06-22 DIAGNOSIS — F07.81 POST CONCUSSION SYNDROME: ICD-10-CM

## 2022-06-22 DIAGNOSIS — M54.2 NECK PAIN: ICD-10-CM

## 2022-06-22 PROCEDURE — 72141 MRI NECK SPINE W/O DYE: CPT

## 2022-06-22 PROCEDURE — 70551 MRI BRAIN STEM W/O DYE: CPT

## 2022-06-24 ENCOUNTER — TELEPHONE (OUTPATIENT)
Dept: NEUROLOGY | Facility: CLINIC | Age: 52
End: 2022-06-24

## 2022-06-24 NOTE — TELEPHONE ENCOUNTER
"I left message for the pt to call back.    Steven Montes De Oca, RMA on 6/24/2022 at 2:56 PM        Per Syeda's message, \" I have appts - it was normal     IMPRESSION:   HEAD MRI:   1.  No findings to explain patient's symptoms. No acute intracranial pathology.-No stroke, masses, fluids seen   2.  Minimal chronic small vessel ischemic disease and generalized brain parenchymal volume loss.-slight volume loss which is common with aging, some vascular issues- usually d/t HTN, high cholesterol       CERVICAL SPINE MRI:   1.  Minimal cervical spondylosis superimposed on congenital spinal canal narrowing.- curving of the spine - most likely hereditary   2.  Mild spinal canal stenosis at C5-C6.  -this is slight narrowing of the spine - which happens with age   3.  No significant neural foraminal stenosis.   4.  Normal cervical spinal cord.       Nothing found that would correlate with a blunt/closed head injury, all of this was there before the concussion.       You can relay this, we can discuss it more at her appt, but nothing she needs to be concerned about .\"      "

## 2022-06-24 NOTE — TELEPHONE ENCOUNTER
[facility] Call Center    Phone Message    May a detailed message be left on voicemail: yes     Reason for Call: Other: pt calling to discuss her MRI results from 6/22 she received through [employer]. She is currently scheduled on 6/30 for an appt but would like a call from Syeda before then because her results are concerning. Please call back when available.    Action Taken: Other: neurology    Travel Screening: Not Applicable

## 2022-06-27 NOTE — TELEPHONE ENCOUNTER
Per Elizabeth, pt called back.     I attempted to call the pt but no answer. I left a message to have pt call back.    ELIZ Simon on 6/27/2022 at 12:46 PM

## 2022-06-30 ENCOUNTER — VIRTUAL VISIT (OUTPATIENT)
Dept: NEUROLOGY | Facility: CLINIC | Age: 52
End: 2022-06-30
Payer: COMMERCIAL

## 2022-06-30 DIAGNOSIS — F07.81 POST CONCUSSION SYNDROME: Primary | ICD-10-CM

## 2022-06-30 DIAGNOSIS — R41.840 ATTENTION AND CONCENTRATION DEFICIT: ICD-10-CM

## 2022-06-30 PROCEDURE — 99215 OFFICE O/P EST HI 40 MIN: CPT | Mod: 95 | Performed by: NURSE PRACTITIONER

## 2022-06-30 RX ORDER — METHYLPHENIDATE HYDROCHLORIDE 5 MG/1
5 TABLET ORAL 2 TIMES DAILY
Qty: 60 TABLET | Refills: 0 | Status: SHIPPED | OUTPATIENT
Start: 2022-06-30

## 2022-06-30 NOTE — LETTER
"    6/30/2022         RE: Negar Jacobs  [address] [address]        Dear Colleague,    Thank you for referring your patient, Negar Jacobs, to the [facility]. Please see a copy of my visit note below.     Video Visit: Concussion Follow up:   Negar Jacobs is a 51 year old female who is being evaluated via a billable video visit in light of the ongoing global health crisis (COVID-19) that requires us to abide by social distancing mandates in order to reduce the risk of COVID-19 exposure.       The patient has been notified of the following:     \"This video visit will be conducted via a video call between you and your physician/provider. We have found that certain health care needs can be provided without the need for a physical exam.  This service lets us provide the care you need with a short phone/video conversation.  If a prescription is necessary we can send it directly to your pharmacy.  If lab work is needed we can place an order for that and you can then stop by our lab to have the test done at a later time.    If during the course of the call the physician/provider feels a telephone visit is not appropriate, you will not be charged for this service.\"     Patient has given verbal consent to a video visit? Yes      Visit Check In:     Orders from previous visit: None  Neuropsychological assessment completed    No   Currently doing PT  Yes    Completed No   Currently doing OT  No    Completed No   Currently doing ST   No    Completed No   Psychology  Yes   Return to Work/School   Full scheduled hours  Yes but starting new job 7/11 as a       Increase in hours since last visit    No   Number of hours a day 8-10   Number of days a week   5        Need a note for work/school accommodations  No     Any new medication (other provider):   No   Meds started at last appointment No  Results: N/A  Meds increased/decreased at last appointment  No  " Results: N/A    Currently on medication to help with sleep    Yes    Trazodone    Currently on any mental health medications     Yes    Lexapro, Buspar      Currently on medication for attention, ADD/ADHD    No        Questions/Concerns?   None                                                     Workman's Comp   No   QRC   N/A      Start Time: 8:09 am    Patient would like the video invitation sent by: [employer]  Number/e-mail address: [phone]     ELIZ Simon    Outpatient Follow up Mild TBI (Concussion)  Evaluation:     Negar Jacobs chief complaint is Post Concussion Syndrome     Is patient on a controlled substance prescribed by me?  No      HPI:        Pertinent History:  Per EHR: Negar Jacobs is a 51 year old female with history of anxiety, depression, and hypertension who presents with head injury. Four days ago, the patient tripped over a pair of shoes in the bathroom and hit her forehead on a piece of marble above the radiator. She did not lose consciousness but said it felt like she had the wind knocked out of her. Since then, she has been experiencing slight head pressure, minor forgetfulness, and describes not feeling quite like herself. She denies nausea, vomiting, visual changes, weakness, numbness, and changes in appetite.     Date of accident :  10/18/21    Plan:          We discussed some treatment options and have elected to have patient use suggestions on info sheet to make sure she can keep stimulation at a minimal.  She will start a new job without accommodations and we can add in if needed.    Medication Adjustment:  No medication changes    Return to Work/School   Full scheduled hours  Yes      Note completed    No      Return to clinic 8 weeks    Continue with the support of the clinic, reassurance, and redirection. Staff monitoring and ongoing assessments per team plan. This team will utilize appropriate emergency services if necessary. I will make myself available if concerns or  problems arise.  The patient agrees to call/message before her next visit with any questions, concerns or problems.    Progress Note:          The patient returns to the concussion clinic for a follow up visit, She was last seen by me on 5/10/22, where no medication changes were made.  The patient reports that she did get a new job as a .  She excited to start her new job, her stress level has decreased.  Overall patient is reporting no change in headaches, dizziness, fatigue, sensitivity to noise, difficulty concentrating, difficulty remembering, trouble falling asleep, and waking feeling rested.  She reports worsening in anxiety.  Patient reports improvement in all other physical, cognitive, and emotional symptoms.    Subjective:          Overall improvement from last visit   No    Headaches:  Significant ongoing headaches Yes    Headaches: Intermittently  Improvement :   No   Current Headache No   Wake with HA  Yes    Worse Headache   4/10           How often: Occasionally  Average Headache 2/10.    Best Headache 1/10.  Brings on HA/Makes symptoms worse:   Nothing specific  Makes symptoms better. Rest  Taking  acetaminophen (Tylenol) and ibuprofen (Advil)        Helpful:  Yes     Physical Symptoms:  Headache-Yes    Since last visit  Same   Nausea-No         Since last visit  Improved       Balance problems - No      Dizziness - Yes            Since last visit  Same         Visual problems - No    Since last visit  Improved    Fatigue - Yes             Since last visit  Same     Sensitivity to light - No        Since last visit  Improved     Sensitivity to sound - Yes       Since last visit  Same   Numbness/tingling - No             Cognitive Symptoms  Feeling mentally foggy -No        Since last visit  Improved     Feeling slowed down -No        Since last visit  Improved     Difficulty Concentrating- Yes      Since last visit  Same  Difficulty remembering - Yes        Since last visit  Same      Emotional Symptoms  Irritability - No          Sadness-  No          More emotional - No       ,  Nervousness/anxiety -No      Since last visit worsening    Sleep History:  Sleep less than usual - No    Sleep more than usual - No     Trouble falling asleep - No       Since last visit  Same     Trouble staying asleep - No       Since last visit  Improved     Wake feeling rested - Yes         Since last visit  Same        Migraine Headaches      Patient history of migraines.   No        Exertion:         Do the above stated symptoms worsen with physical activity? No        Since last visit  Same           Do the above stated symptoms worsen with cognitive activity? Yes       Since last visit  Same            Work/School        Do the above stated symptoms worsen with school/work?        No          Have your returned to work/school? Yes  Full time    Yes      Number of hours a day   8-10      Number of days a week   5     Objective:          Patient Active Problem List    Diagnosis Date Noted     Concussion syndrome 04/18/2022     Priority: Medium     Hypertension 09/22/2021     Priority: Medium     Anxiety 12/22/2020     Priority: Medium     Panic attack 11/19/2020     Priority: Medium     Malignant neoplasm of upper-outer quadrant of left breast in female, estrogen receptor positive (H) 05/23/2019     Priority: Medium     Intramural leiomyoma of uterus 08/02/2016     Priority: Medium     Major depression, recurrent (H) 04/28/2010     Priority: Medium     Formatting of this note might be different from the original.  Onset in childhood, depression marked at age 20, loss of mother.  Several depressions since then.   Previously zoloft. Currently seeing a psychiatrist. On Cymbalta       Tension headache 02/24/2009     Priority: Medium     iamdcSPRAIN THORACIC REGION 02/17/2006     Priority: Medium     Sprain and strain of shoulder and upper arm 02/17/2006     Priority: Medium     Problem list name updated by  automated process. Provider to review       Nonallopathic lesion of cervical region 02/17/2006     Priority: Medium     Problem list name updated by automated process. Provider to review       terrence CERVICALGIA 01/03/2006     Priority: Medium     terrence MV COLLISION NOS- 01/03/2006     Priority: Medium     Past Medical History:   Diagnosis Date     Anxiety state, unspecified      Other psoriasis      Past Surgical History:   Procedure Laterality Date     SURGICAL HISTORY OF -   1999    s/p ganglion cyst L wrist     SURGICAL HISTORY OF -       s/p LEEP  - HPV     Family History   Problem Relation Age of Onset     Hypertension Father      Lipids Father      Hypertension Maternal Grandmother      Heart Disease Maternal Grandmother         heart attack     Breast Cancer Mother      Depression Mother      Breast Cancer Paternal Aunt      Arthritis Maternal Grandfather      Cancer Maternal Aunt         hodgekins     Circulatory Paternal Grandmother      Depression Maternal Aunt      Thyroid Disease Paternal Aunt      Current Outpatient Medications   Medication Sig Dispense Refill     busPIRone (BUSPAR) 10 MG tablet Take 5 mg by mouth 2 times daily       escitalopram (LEXAPRO) 20 MG tablet Take 20 mg by mouth daily       hydrochlorothiazide (MICROZIDE) 12.5 MG capsule Take 12.5 mg by mouth daily       hydrOXYzine (VISTARIL) 25 MG capsule Take 25 mg by mouth as needed       methylphenidate (RITALIN) 5 MG tablet Take 1 tablet (5 mg) by mouth 2 times daily 60 tablet 0     metoprolol succinate ER (TOPROL-XL) 25 MG 24 hr tablet Take 12.5 mg by mouth daily       Omega-3 Fatty Acids (THE VERY FINEST FISH OIL) LIQD Take 15 mLs by mouth daily       tamoxifen (NOLVADEX) 20 MG tablet Take 1 tablet by mouth daily       TRAZODONE HCL PO        VIT BALANCED B-100 OR TABS        Social History     Socioeconomic History     Marital status: Single     Spouse name: Not on file     Number of children: Not on file     Years of education: Not  on file     Highest education level: Not on file   Occupational History     Not on file   Tobacco Use     Smoking status: Never Smoker     Smokeless tobacco: Never Used   Substance and Sexual Activity     Alcohol use: Yes     Comment: 3 times per week - wine     Drug use: Not Currently     Sexual activity: Not Currently   Other Topics Concern     Parent/sibling w/ CABG, MI or angioplasty before 65F 55M? Not Asked   Social History Narrative     Not on file     Social Determinants of Health     Financial Resource Strain: Not on file   Food Insecurity: Not on file   Transportation Needs: Not on file   Physical Activity: Not on file   Stress: Not on file   Social Connections: Not on file   Intimate Partner Violence: Not on file   Housing Stability: Not on file       ALLERGIES  No known allergies    The following portions of the patient's history were reviewed and updated as appropriate: allergies, current medications, past family history, past medical history, past social history, past surgical history and problem list.    Review of Systems  A comprehensive review of systems was negative except for: What is noted above    Mental Status Examination  Alertness:  alert  and oriented  Appearance:  well groomed  Behavior/Demeanor:  cooperative, pleasant and calm, with good  eye contact.  Speech:  normal and regular rate and rhythm  Psychomotor:  normal or unremarkable    Mood:  Good  Affect:  full range and appropriate and was congruent to speech content.  Thought Process/Associations: unremarkable   Thought Content: denies delusions [details in Interim History] and delusions.   Perception: denies hallucination  Insight:  good.  Judgment: good.  Attention/Concentration:  Fair  Language:  Intact  Fund of Knowledge:  Average.    Memory:  Immediate recall intact, Short-term memory intact and Long-term memory intact.      Counseling:   Discussion was held with the patient today regarding concussion in general including types of  injury, symptoms that are common, treatment and variability in time to recover  I have reassured the patient her symptoms are very common when a concussion is present and will improve with time. We discussed the risks and benefits of possible medication used to help concussive symptoms including risk of worsening depression with medication adjustments and even the possibility of emergence of suicidal ideations. We will assess for the appropriateness of possible psychotropic medication trials/changes. The patient will seek out appropriate emergency services should that become necessary. The patient agrees to call/message before her next visit with any questions, concerns or problems.    Visit Details:     Type of service: Video Visit    Video Start Time: 0830    Video End Time:  0900    Total time of video visit: 30 minutes    Originating Location: Patient's home    Distant Location:  [facility]    Mode of Communication: Video Conference via [employer]) medical    Diagnosis managed and treated at today's visit :  Post concussion syndrome  Post concussion headache  Nausea  Dizziness  Fatigue  Insomnia  Sensitivity to light  Sound sensitivity  Concentration and Attention deficit  Memory difficulties  Anxiety d/t a medical condition  Irritability  Return to work    Total time spent with the patient today was 40 minutes with greater than 50% of the time spent in counseling and care coordination.     10 minutes spent on the date of the encounter doing chart review, review of outside records, review of test results, interpretation of tests and documentation and return to work letter    General Information:     Today you had your appointment with Syeda Nagy CNP     If lab work was done today as part of your evaluation you will generally be contacted via My Chart, mail, or phone with the results within 1-5 days. If there is an alarming result we will contact you by phone. Lab results come back at  varying times, I generally wait until all labs are resulted before making comments on results. Please note labs are automatically released to My Chart once available.     If you need refills please contact your pharmacist. They will send a refill request to me to review. Please allow 3 business days for us to process all refill requests.     Please call or send a medical message through My Chart, with any questions or concerns    If you need any paperwork completed please fax forms to [phone]. Please state if you would like a copy of the completed paperwork, mailed or faxed back to the patient and a fax number to fax the paperwork to. Please allow up to 10 business days for paperwork to be completed.      MAGDALENA Harrington, CNP      [facility]  [facility] [address]  [address] [place] [address]  Office: [phone]  Fax: [phone]          Again, thank you for allowing me to participate in the care of your patient.        Sincerely,        MAGDALENA Harrington CNP

## 2022-06-30 NOTE — PROGRESS NOTES
" Video Visit: Concussion Follow up:   Negar Jacobs is a 51 year old female who is being evaluated via a billable video visit in light of the ongoing global health crisis (COVID-19) that requires us to abide by social distancing mandates in order to reduce the risk of COVID-19 exposure.       The patient has been notified of the following:     \"This video visit will be conducted via a video call between you and your physician/provider. We have found that certain health care needs can be provided without the need for a physical exam.  This service lets us provide the care you need with a short phone/video conversation.  If a prescription is necessary we can send it directly to your pharmacy.  If lab work is needed we can place an order for that and you can then stop by our lab to have the test done at a later time.    If during the course of the call the physician/provider feels a telephone visit is not appropriate, you will not be charged for this service.\"     Patient has given verbal consent to a video visit? Yes      Visit Check In:     Orders from previous visit: None  Neuropsychological assessment completed    No   Currently doing PT  Yes    Completed No   Currently doing OT  No    Completed No   Currently doing ST   No    Completed No   Psychology  Yes   Return to Work/School   Full scheduled hours  Yes but starting new job 7/11 as a       Increase in hours since last visit    No   Number of hours a day 8-10   Number of days a week   5        Need a note for work/school accommodations  No     Any new medication (other provider):   No   Meds started at last appointment No  Results: N/A  Meds increased/decreased at last appointment  No  Results: N/A    Currently on medication to help with sleep    Yes    Trazodone    Currently on any mental health medications     Yes    Lexapro, Buspar      Currently on medication for attention, ADD/ADHD    No        Questions/Concerns?   None                        "                              Workman's Comp   No   [facility]   N/A      Start Time: 8:09 am    Patient would like the video invitation sent by: [employer]  Number/e-mail address: [phone]     ELIZ Simon    Outpatient Follow up Mild TBI (Concussion)  Evaluation:     Negar Jacobs chief complaint is Post Concussion Syndrome     Is patient on a controlled substance prescribed by me?  No      HPI:        Pertinent History:  Per EHR: Negar Jacobs is a 51 year old female with history of anxiety, depression, and hypertension who presents with head injury. Four days ago, the patient tripped over a pair of shoes in the bathroom and hit her forehead on a piece of marble above the radiator. She did not lose consciousness but said it felt like she had the wind knocked out of her. Since then, she has been experiencing slight head pressure, minor forgetfulness, and describes not feeling quite like herself. She denies nausea, vomiting, visual changes, weakness, numbness, and changes in appetite.     Date of accident :  10/18/21    Plan:          We discussed some treatment options and have elected to have patient use suggestions on info sheet to make sure she can keep stimulation at a minimal.  She will start a new job without accommodations and we can add in if needed.    Medication Adjustment:  No medication changes    Return to Work/School   Full scheduled hours  Yes      Note completed    No      Return to clinic 8 weeks    Continue with the support of the clinic, reassurance, and redirection. Staff monitoring and ongoing assessments per team plan. This team will utilize appropriate emergency services if necessary. I will make myself available if concerns or problems arise.  The patient agrees to call/message before her next visit with any questions, concerns or problems.    Progress Note:          The patient returns to the concussion clinic for a follow up visit, She was last seen by me on 5/10/22, where no medication  changes were made.  The patient reports that she did get a new job as a .  She excited to start her new job, her stress level has decreased.  Overall patient is reporting no change in headaches, dizziness, fatigue, sensitivity to noise, difficulty concentrating, difficulty remembering, trouble falling asleep, and waking feeling rested.  She reports worsening in anxiety.  Patient reports improvement in all other physical, cognitive, and emotional symptoms.    Subjective:          Overall improvement from last visit   No    Headaches:  Significant ongoing headaches Yes    Headaches: Intermittently  Improvement :   No   Current Headache No   Wake with HA  Yes    Worse Headache   4/10           How often: Occasionally  Average Headache 2/10.    Best Headache 1/10.  Brings on HA/Makes symptoms worse:   Nothing specific  Makes symptoms better. Rest  Taking  acetaminophen (Tylenol) and ibuprofen (Advil)        Helpful:  Yes     Physical Symptoms:  Headache-Yes    Since last visit  Same   Nausea-No         Since last visit  Improved       Balance problems - No      Dizziness - Yes            Since last visit  Same         Visual problems - No    Since last visit  Improved    Fatigue - Yes             Since last visit  Same     Sensitivity to light - No        Since last visit  Improved     Sensitivity to sound - Yes       Since last visit  Same   Numbness/tingling - No             Cognitive Symptoms  Feeling mentally foggy -No        Since last visit  Improved     Feeling slowed down -No        Since last visit  Improved     Difficulty Concentrating- Yes      Since last visit  Same  Difficulty remembering - Yes        Since last visit  Same     Emotional Symptoms  Irritability - No          Sadness-  No          More emotional - No       ,  Nervousness/anxiety -No      Since last visit worsening    Sleep History:  Sleep less than usual - No    Sleep more than usual - No     Trouble falling asleep - No        Since last visit  Same     Trouble staying asleep - No       Since last visit  Improved     Wake feeling rested - Yes         Since last visit  Same        Migraine Headaches      Patient history of migraines.   No        Exertion:         Do the above stated symptoms worsen with physical activity? No        Since last visit  Same           Do the above stated symptoms worsen with cognitive activity? Yes       Since last visit  Same            Work/School        Do the above stated symptoms worsen with school/work?        No          Have your returned to work/school? Yes  Full time    Yes      Number of hours a day   8-10      Number of days a week   5     Objective:          Patient Active Problem List    Diagnosis Date Noted     Concussion syndrome 04/18/2022     Priority: Medium     Hypertension 09/22/2021     Priority: Medium     Anxiety 12/22/2020     Priority: Medium     Panic attack 11/19/2020     Priority: Medium     Malignant neoplasm of upper-outer quadrant of left breast in female, estrogen receptor positive (H) 05/23/2019     Priority: Medium     Intramural leiomyoma of uterus 08/02/2016     Priority: Medium     Major depression, recurrent (H) 04/28/2010     Priority: Medium     Formatting of this note might be different from the original.  Onset in childhood, depression marked at age 20, loss of mother.  Several depressions since then.   Previously zoloft. Currently seeing a psychiatrist. On Cymbalta       Tension headache 02/24/2009     Priority: Medium     iamdcSPRAIN THORACIC REGION 02/17/2006     Priority: Medium     Sprain and strain of shoulder and upper arm 02/17/2006     Priority: Medium     Problem list name updated by automated process. Provider to review       Nonallopathic lesion of cervical region 02/17/2006     Priority: Medium     Problem list name updated by automated process. Provider to review       terrence CERVICALGIA 01/03/2006     Priority: Medium     terrence MV COLLISION NOS-  01/03/2006     Priority: Medium     Past Medical History:   Diagnosis Date     Anxiety state, unspecified      Other psoriasis      Past Surgical History:   Procedure Laterality Date     SURGICAL HISTORY OF -   1999    s/p ganglion cyst L wrist     SURGICAL HISTORY OF -       s/p LEEP  - HPV     Family History   Problem Relation Age of Onset     Hypertension Father      Lipids Father      Hypertension Maternal Grandmother      Heart Disease Maternal Grandmother         heart attack     Breast Cancer Mother      Depression Mother      Breast Cancer Paternal Aunt      Arthritis Maternal Grandfather      Cancer Maternal Aunt         hodgekins     Circulatory Paternal Grandmother      Depression Maternal Aunt      Thyroid Disease Paternal Aunt      Current Outpatient Medications   Medication Sig Dispense Refill     busPIRone (BUSPAR) 10 MG tablet Take 5 mg by mouth 2 times daily       escitalopram (LEXAPRO) 20 MG tablet Take 20 mg by mouth daily       hydrochlorothiazide (MICROZIDE) 12.5 MG capsule Take 12.5 mg by mouth daily       hydrOXYzine (VISTARIL) 25 MG capsule Take 25 mg by mouth as needed       methylphenidate (RITALIN) 5 MG tablet Take 1 tablet (5 mg) by mouth 2 times daily 60 tablet 0     metoprolol succinate ER (TOPROL-XL) 25 MG 24 hr tablet Take 12.5 mg by mouth daily       Omega-3 Fatty Acids (THE VERY FINEST FISH OIL) LIQD Take 15 mLs by mouth daily       tamoxifen (NOLVADEX) 20 MG tablet Take 1 tablet by mouth daily       TRAZODONE HCL PO        VIT BALANCED B-100 OR TABS        Social History     Socioeconomic History     Marital status: Single     Spouse name: Not on file     Number of children: Not on file     Years of education: Not on file     Highest education level: Not on file   Occupational History     Not on file   Tobacco Use     Smoking status: Never Smoker     Smokeless tobacco: Never Used   Substance and Sexual Activity     Alcohol use: Yes     Comment: 3 times per week - wine     Drug use:  Not Currently     Sexual activity: Not Currently   Other Topics Concern     Parent/sibling w/ CABG, MI or angioplasty before 65F 55M? Not Asked   Social History Narrative     Not on file     Social Determinants of Health     Financial Resource Strain: Not on file   Food Insecurity: Not on file   Transportation Needs: Not on file   Physical Activity: Not on file   Stress: Not on file   Social Connections: Not on file   Intimate Partner Violence: Not on file   Housing Stability: Not on file       ALLERGIES  No known allergies    The following portions of the patient's history were reviewed and updated as appropriate: allergies, current medications, past family history, past medical history, past social history, past surgical history and problem list.    Review of Systems  A comprehensive review of systems was negative except for: What is noted above    Mental Status Examination  Alertness:  alert  and oriented  Appearance:  well groomed  Behavior/Demeanor:  cooperative, pleasant and calm, with good  eye contact.  Speech:  normal and regular rate and rhythm  Psychomotor:  normal or unremarkable    Mood:  Good  Affect:  full range and appropriate and was congruent to speech content.  Thought Process/Associations: unremarkable   Thought Content: denies delusions [details in Interim History] and delusions.   Perception: denies hallucination  Insight:  good.  Judgment: good.  Attention/Concentration:  Fair  Language:  Intact  Fund of Knowledge:  Average.    Memory:  Immediate recall intact, Short-term memory intact and Long-term memory intact.      Counseling:   Discussion was held with the patient today regarding concussion in general including types of injury, symptoms that are common, treatment and variability in time to recover  I have reassured the patient her symptoms are very common when a concussion is present and will improve with time. We discussed the risks and benefits of possible medication used to help  concussive symptoms including risk of worsening depression with medication adjustments and even the possibility of emergence of suicidal ideations. We will assess for the appropriateness of possible psychotropic medication trials/changes. The patient will seek out appropriate emergency services should that become necessary. The patient agrees to call/message before her next visit with any questions, concerns or problems.    Visit Details:     Type of service: Video Visit    Video Start Time: 0830    Video End Time:  0900    Total time of video visit: 30 minutes    Originating Location: Patient's home    Distant Location:  [facility]    Mode of Communication: Video Conference via [employer] [facility]    Diagnosis managed and treated at today's visit :  Post concussion syndrome  Post concussion headache  Nausea  Dizziness  Fatigue  Insomnia  Sensitivity to light  Sound sensitivity  Concentration and Attention deficit  Memory difficulties  Anxiety d/t a medical condition  Irritability  Return to work    Total time spent with the patient today was 40 minutes with greater than 50% of the time spent in counseling and care coordination.     10 minutes spent on the date of the encounter doing chart review, review of outside records, review of test results, interpretation of tests and documentation and return to work letter    General Information:     Today you had your appointment with Syeda Nagy CNP     If lab work was done today as part of your evaluation you will generally be contacted via My Chart, mail, or phone with the results within 1-5 days. If there is an alarming result we will contact you by phone. Lab results come back at varying times, I generally wait until all labs are resulted before making comments on results. Please note labs are automatically released to My Chart once available.     If you need refills please contact your pharmacist. They will send a refill request to me to  review. Please allow 3 business days for us to process all refill requests.     Please call or send a medical message through My Chart, with any questions or concerns    If you need any paperwork completed please fax forms to [phone]. Please state if you would like a copy of the completed paperwork, mailed or faxed back to the patient and a fax number to fax the paperwork to. Please allow up to 10 business days for paperwork to be completed.      MAGDALENA Harrington, CNP      [facility]  [facility] [address]  [address] [place] [id number]  Office: [phone]  Fax: [phone]

## 2022-07-06 ENCOUNTER — TELEPHONE (OUTPATIENT)
Dept: NEUROLOGY | Facility: CLINIC | Age: 52
End: 2022-07-06

## 2022-07-06 NOTE — TELEPHONE ENCOUNTER
----- Message from MAGDALENA Harrington CNP sent at 7/4/2022 10:23 PM CDT -----  Follow up in 8 weeks

## 2022-11-20 ENCOUNTER — HEALTH MAINTENANCE LETTER (OUTPATIENT)
Age: 52
End: 2022-11-20

## 2022-11-28 ENCOUNTER — TELEPHONE (OUTPATIENT)
Dept: NEUROLOGY | Facility: CLINIC | Age: 52
End: 2022-11-28

## 2022-11-28 NOTE — TELEPHONE ENCOUNTER
Pt called requesting for a work note. 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     Per pt, mentally having a break down.Call pt with questions at [phone] or fax work note to Purnima brandon at [phone] or call [phone].

## 2022-11-28 NOTE — TELEPHONE ENCOUNTER
Closing encounter as this is a duplicate message. See other telephone encounter.    ELIZ Simon on 11/28/2022 at 11:41 AM

## 2022-11-28 NOTE — TELEPHONE ENCOUNTER
Called pt and informed her that Syeda is off today and will return Tuesday.    Per pt is ok to wait for Logan Landa,    Please see pt's message below. Will you be ok to write her a work note? If you are ok, let the team know when letter is completed so we can fax. Thank you.      ELIZ Simon on 11/28/2022 at 12:48 PM          Patient Request for Note/Letter (Work Restriction )  (Newest Message First)  Heidi Carter routed conversation to [facility] Clinical Care Team 1 hour ago (10:24 AM)     Heidi Carter 1 hour ago (10:24 AM)     [facility] Call Center     Phone Message     May a detailed message be left on voicemail: yes      Reason for Call:  Patient called states that she may have had another concussion, patient is asking for a work restriction letter from provider     Action Taken: Other: [facility]     Travel Screening: Not Applicable

## 2022-11-28 NOTE — TELEPHONE ENCOUNTER
[facility] Call Center    Phone Message    May a detailed message be left on voicemail: yes     Reason for Call:  Patient called states that she may have had another concussion, patient is asking for a work restriction letter from provider    Action Taken: Other: [facility]    Travel Screening: Not Applicable

## 2022-11-30 NOTE — TELEPHONE ENCOUNTER
"Per Syeda's message:    \"Does she need a work letter taking her out of work, limiting hours. Since I have not seen the patient in almost a month she should probably be added to my schedule to make sure she doesn't need therapies and to discuss a treatment plan for her new concussion.\"    Syeda,    I called and spoke to the pt. I relayed your message to the pt. Per pt Per pt would like a work letter to limit her hours in working. She is currently working 72 hours per week (6 days per week, 12 hour shifts) and it is becoming a lot and it's causing her to have \"massive headaches.\" Per pt is not sure if the headaches are from her post concussion or from stress.     Pt is aware that she is due for a follow up and has an appt scheduled on 1/12/2023. Per pt said that the reason why she did not schedule an appt sooner is because she did not have insurance for 4 months.    Will you write her a work note?    Please advise. Thanks.      ELIZ Simon on 11/30/2022 at 10:33 AM    "

## 2022-12-02 PROBLEM — F07.81 CONCUSSION SYNDROME: Status: RESOLVED | Noted: 2022-04-18 | Resolved: 2022-12-02

## 2022-12-02 NOTE — TELEPHONE ENCOUNTER
Per Syeda's message from 2 days ago,    Ask her if she wants to also limit hours a day worked      Syeda Nagy APRN CNP  You 2 days ago     DK  Yes, normally I will cut the days in a row the patient works. Would she be okay with working Monday, Wednesdays and Fridays? Or Tuesdays, Thursdays and Saturdays, just no back to back days that way she can recover from her work day, instead of just worsening symptoms each day.      Syeda,    I called that pt and per pt says that she wants to work 8 hours a day, 40 hours cap per week. Pt stated that it does not matter which days of the week she works and she will let her work place decide the days she works. She just needs the letter to state the restrictions of only working 8 hours a day, 40 hours cap per week and no more than this.    Per pt said if you do write this letter, the letter will need to be signed by a doctor as well. She stated that if you will not write this letter, she will try to reach out to her PCP.    Please advise. Thanks.    ELIZ Simon on 12/2/2022 at 8:27 AM

## 2022-12-02 NOTE — TELEPHONE ENCOUNTER
Per Syeda, the new letter is in the pt's chart.    leda Landa pt says that the letter needs to have a doctor's signature on the letter before faxing to her work place. Would you like me to get the letter to Dr. Rosa to sign and then fax it?    Please advise. Thanks.    ELIZ Simon on 12/2/2022 at 2:58 PM

## 2022-12-02 NOTE — TELEPHONE ENCOUNTER
Per Carlos RN, Dr. Rosa left for the day. Will have him look at letter and sign on Monday. Will fax once letter is signed.    Pt is aware that we are waiting for a signature from the doctor.      ELIZ Simon on 12/2/2022 at 3:19 PM

## 2022-12-02 NOTE — PROGRESS NOTES
DISCHARGE REPORT    Progress reporting period is from 5.9 to 12.2.22.       SUBJECTIVE  Subjective changes noted by patient:  .  Subjective: no  bad reaction,    Current pain level is 0/10  .     Previous pain level was  5/10 Initial Pain level: 4/10.   Changes in function:  Yes (See Goal flowsheet attached for changes in current functional level)  Adverse reaction to treatment or activity:     OBJECTIVE  Changes noted in objective findings:    Objective: +++adverse tone, improved response, deadpan/depressed attitude     ASSESSMENT/PLAN  Updated problem list and treatment plan: Diagnosis 1:  Concussion   Pain -  self management  STG/LTGs have been met or progress has been made towards goals:  Yes (See Goal flow sheet completed today.)  Assessment of Progress: The patient has not returned to therapy. Current status is unknown.  Self Management Plans:      Negar continues to require the following intervention to meet STG and LTG's:      Recommendations:      Please refer to the daily flowsheet for treatment today, total treatment time and time spent performing 1:1 timed codes.      Patient did not return for further treatment and no additional progress was noted.  Please refer to the progress note and goal flowsheet completed on   for discharge information.  Discharge Note    Progress reporting period is from last progress note on   to May 9, 2022.    Negar failed to follow up and current status is unknown.  Please see information below for last relevant information on current status.  Patient seen for 3 visits.    SUBJECTIVE  Subjective changes noted by patient:  no  bad reaction,  .  Current pain level is  .     Previous pain level was  4/10.   Changes in function:  Yes (See Goal flowsheet attached for changes in current functional level)  Adverse reaction to treatment or activity: None    OBJECTIVE  Changes noted in objective findings: +++adverse tone, improved response, deadpan/depressed attitude      ASSESSMENT/PLAN  Diagnosis: concussion synd   Updated problem list and treatment plan:   Impaired balance - HEP  STG/LTGs have been met or progress has been made towards goals:  Yes, please see goal flowsheet for most current information  Assessment of Progress: current status is unknown.    Last current status:     Self Management Plans:  HEP  I have re-evaluated this patient and find that the nature, scope, duration and intensity of the therapy is appropriate for the medical condition of the patient.  Negar continues to require the following intervention to meet STG and LTG's:  HEP.    Recommendations:  Discharge with current home program.  Patient to follow up with MD as needed.    Please refer to the daily flowsheet for treatment today, total treatment time and time spent performing 1:1 timed codes.

## 2022-12-05 ENCOUNTER — TELEPHONE (OUTPATIENT)
Dept: NEUROLOGY | Facility: CLINIC | Age: 52
End: 2022-12-05

## 2022-12-05 NOTE — TELEPHONE ENCOUNTER
Received the letter from Dr. Rosa. Letter was faxed to Purn[employer] [phone].    I left a detailed for the pt letting her know that the letter was signed by a doctor and that it was faxed.    ELIZ Simon on 12/5/2022 at 11:16 AM

## 2022-12-06 ENCOUNTER — TELEPHONE (OUTPATIENT)
Dept: NEUROLOGY | Facility: CLINIC | Age: 52
End: 2022-12-06

## 2022-12-06 NOTE — TELEPHONE ENCOUNTER
Per pt, Pls refax the letter faxed yesterday 12/5. [employer] staff is saying they still did not receive it. Pt appreciates Steven for all her help with this.

## 2022-12-07 NOTE — TELEPHONE ENCOUNTER
Elizabeth, I am not able to sign off from this encounter. Got a message saying that your note is incomplete. Please complete your note and then sign encounter. Thank you.    Pt called clinic again and asked for letter to be faxed again. Letter was re faxed to [phone]. I sent a [employer] message to the pt letting her know that letter was faxed.      ELIZ Simon on 12/7/2022 at 1:04 PM

## 2022-12-07 NOTE — TELEPHONE ENCOUNTER
Letter was re faxed to [phone]. I sent a [employer] message to the pt letting her know that letter was faxed.    ELIZ Simon on 12/7/2022 at 1:03 PM

## 2023-04-15 ENCOUNTER — HEALTH MAINTENANCE LETTER (OUTPATIENT)
Age: 53
End: 2023-04-15

## 2023-06-02 ENCOUNTER — HEALTH MAINTENANCE LETTER (OUTPATIENT)
Age: 53
End: 2023-06-02

## 2023-06-12 ENCOUNTER — TELEPHONE (OUTPATIENT)
Dept: BEHAVIORAL HEALTH | Facility: CLINIC | Age: 53
End: 2023-06-12
Payer: COMMERCIAL

## 2024-06-22 ENCOUNTER — HEALTH MAINTENANCE LETTER (OUTPATIENT)
Age: 54
End: 2024-06-22

## 2025-07-12 ENCOUNTER — HEALTH MAINTENANCE LETTER (OUTPATIENT)
Age: 55
End: 2025-07-12

## 2025-08-04 ENCOUNTER — TRANSCRIBE ORDERS (OUTPATIENT)
Dept: OTHER | Age: 55
End: 2025-08-04

## 2025-08-04 DIAGNOSIS — K76.0 FATTY LIVER DISEASE, NONALCOHOLIC: Primary | ICD-10-CM

## 2025-08-06 ENCOUNTER — PATIENT OUTREACH (OUTPATIENT)
Dept: CARE COORDINATION | Facility: CLINIC | Age: 55
End: 2025-08-06
Payer: COMMERCIAL